# Patient Record
Sex: FEMALE | Race: BLACK OR AFRICAN AMERICAN | Employment: OTHER | URBAN - METROPOLITAN AREA
[De-identification: names, ages, dates, MRNs, and addresses within clinical notes are randomized per-mention and may not be internally consistent; named-entity substitution may affect disease eponyms.]

---

## 2020-10-28 ENCOUNTER — OFFICE VISIT (OUTPATIENT)
Dept: ORTHOPEDIC SURGERY | Age: 45
End: 2020-10-28
Payer: COMMERCIAL

## 2020-10-28 VITALS
HEART RATE: 80 BPM | OXYGEN SATURATION: 99 % | HEIGHT: 67 IN | TEMPERATURE: 98.4 F | DIASTOLIC BLOOD PRESSURE: 88 MMHG | BODY MASS INDEX: 28.88 KG/M2 | RESPIRATION RATE: 17 BRPM | WEIGHT: 184 LBS | SYSTOLIC BLOOD PRESSURE: 149 MMHG

## 2020-10-28 DIAGNOSIS — M75.111 NONTRAUMATIC INCOMPLETE BILATERAL ROTATOR CUFF TEAR: Primary | ICD-10-CM

## 2020-10-28 DIAGNOSIS — M75.112 NONTRAUMATIC INCOMPLETE BILATERAL ROTATOR CUFF TEAR: Primary | ICD-10-CM

## 2020-10-28 PROCEDURE — 99204 OFFICE O/P NEW MOD 45 MIN: CPT | Performed by: ORTHOPAEDIC SURGERY

## 2020-10-28 NOTE — PROGRESS NOTES
Patient: Wesley Chandra                MRN: [id number]       SSN: [id number]  YOB: 1975        AGE: 50 y.o. SEX: female  Body mass index is 28.82 kg/m². PCP: Karley, Not On File  10/28/20    CHIEF COMPLAINT: Bilateral shoulder pain 7/10    HPI: Wesley Chandra is a 50 y.o. female patient who presents to the office today of bilateral shoulder pain. She has the right is worse than the left. She rates the right is 7 out of 10. She has been dealing with her shoulder pain for 2 years. She is at home physical therapy as well as an injection ordered by her primary care doctor which is not resolved her pain. She is had x-rays and an MRI. She has not had in person physical therapy. She says initially she had stiffness but now she reports mostly stiffness and pain. The pain bothers her at night. Past Medical History:   Diagnosis Date    Anxiety     Anxiety     Depression     Insomnia     Migraine     PTSD (post-traumatic stress disorder)        Family History   Problem Relation Age of Onset    Cancer Sister         panreatic    Diabetes Mother     Heart Disease Mother        Current Outpatient Medications   Medication Sig Dispense Refill    gabapentin (NEURONTIN) 400 mg capsule Take 400 mg by mouth three (3) times daily.  ibuprofen (MOTRIN) 600 mg tablet Take 1 tablet by mouth every six (6) hours as needed for Pain. 20 tablet 0    traZODone (DESYREL) 100 mg tablet Take 100 mg by mouth nightly.  QUEtiapine (SEROQUEL) 200 mg tablet Take 200 mg by mouth two (2) times a day.  verapamil (CALAN) 80 mg tablet Take  by mouth three (3) times daily.  buPROPion SR (WELLBUTRIN SR) 150 mg SR tablet Take  by mouth two (2) times a day.  sertraline (ZOLOFT) 100 mg tablet Take  by mouth daily.  chlorpheniramine-HYDROcodone (TUSSIONEX PENNKINETIC ER) 8-10 mg/5 mL suspension Take 5 mL by mouth every twelve (12) hours as needed for Cough.  60 mL 0    fexofenadine (ALLEGRA) 60 mg tablet Take 1 tablet by mouth daily.  20 tablet 0       Allergies   Allergen Reactions    Pcn [Penicillins] Angioedema       Past Surgical History:   Procedure Laterality Date    HX BREAST BIOPSY      left breast       Social History     Socioeconomic History    Marital status:      Spouse name: Not on file    Number of children: Not on file    Years of education: Not on file    Highest education level: Not on file   Occupational History    Not on file   Social Needs    Financial resource strain: Not on file    Food insecurity     Worry: Not on file     Inability: Not on file    Transportation needs     Medical: Not on file     Non-medical: Not on file   Tobacco Use    Smoking status: Never Smoker    Smokeless tobacco: Never Used   Substance and Sexual Activity    Alcohol use: No    Drug use: No    Sexual activity: Not on file   Lifestyle    Physical activity     Days per week: Not on file     Minutes per session: Not on file    Stress: Not on file   Relationships    Social connections     Talks on phone: Not on file     Gets together: Not on file     Attends Druze service: Not on file     Active member of club or organization: Not on file     Attends meetings of clubs or organizations: Not on file     Relationship status: Not on file    Intimate partner violence     Fear of current or ex partner: Not on file     Emotionally abused: Not on file     Physically abused: Not on file     Forced sexual activity: Not on file   Other Topics Concern    Not on file   Social History Narrative    Not on file       REVIEW OF SYSTEMS:      CON: negative for recent weight loss/gain, fever, or chills  EYE: negative for double or blurry vision  ENT: negative for hoarseness  RS:   negative for cough, URI, SOB  CV:  negative for chest pain, palpitations  GI:    negative for blood in stool, nausea/vomiting  :  negative for blood in urine  MS: As per HPI  Other systems reviewed and noted below. PHYSICAL EXAMINATION:  Visit Vitals  BP (!) 149/88 (BP 1 Location: Right arm)   Pulse 80   Temp 98.4 °F (36.9 °C)   Resp 17   Ht 5' 7\" (1.702 m)   Wt 184 lb (83.5 kg)   SpO2 99%   BMI 28.82 kg/m²     Body mass index is 28.82 kg/m². GENERAL: Alert and oriented x3, in no acute distress, well-developed, well-nourished. HEENT: Normocephalic, atraumatic. RESP: Non labored breathing with equal chest rise on inspiration. CV: Well perfused extremities. No cyanosis or clubbing noted. ABDOMEN: Soft, non-tender, non-distended. Shoulder Examination     R   L  ROM   FF  Full   Full  ER  Full   Full   IR  Full   Full  Rotator Cuff Pain   Supra  +   +   Infra  +   -   Subscap -   -  Crepitus  -   -  Effusion  -   -  Warmth  -   -   Erythema  -   -  Instability  -   -  AC Joint TTP  -   -  Clavicle   Deformity -   -   TTP  -   -  Proximal Humerus   Deformity -   -   TTP  -   -  Deltoid Strength 5   5  Biceps Strength 5   5  Biceps Deformity -   -  Biceps Groove Pain +   -  Impingement Sign -   -   Pain with active and passive range of motion at the extremes of motion especially overhead. IMAGING:  X-rays of both shoulders previously taken were reviewed in the office today. These show no significant bony abnormalities    An MRI of the right shoulder was reviewed which shows high-grade partial-thickness tear of the supraspinatus. No muscle atrophy or full-thickness tears are noted. No tearing of the biceps is noted. An MRI of the left shoulder was also reviewed today which again shows a high-grade partial-thickness tear of the supraspinatus. No full-thickness tears are identified. No muscle atrophy. ASSESSMENT & PLAN  Diagnosis: Bilateral high-grade partial-thickness rotator cuff tears, supraspinatus    Marisela ([place] has bilateral rotator cuff partial-thickness tears high-grade. She has had video physical therapy but has not had any in person physical therapy.   We discussed the possibility of surgery. At this point time we will try a structured in person physical therapy program to see how she responds to that. If she gets better she can leave this alone will continue with therapy exercises. If her symptoms persist despite therapy we did discuss the possibility of surgery in the form of arthroscopic rotator cuff repair. I will see her back in 6 weeks to see how she responds.     Total Visit Time over 45 minutes    Electronically signed by: Edil Santamaria MD

## 2020-11-03 ENCOUNTER — HOSPITAL ENCOUNTER (OUTPATIENT)
Dept: PHYSICAL THERAPY | Age: 45
Discharge: HOME | End: 2020-11-03
Attending: ORTHOPAEDIC SURGERY
Payer: OTHER GOVERNMENT

## 2020-11-03 PROCEDURE — 97530 THERAPEUTIC ACTIVITIES: CPT

## 2020-11-03 PROCEDURE — 97162 PT EVAL MOD COMPLEX 30 MIN: CPT

## 2020-11-03 PROCEDURE — 97110 THERAPEUTIC EXERCISES: CPT

## 2020-11-03 NOTE — PROGRESS NOTES
PT DAILY TREATMENT NOTE     Patient Name: Amari Varghese  Date:11/3/2020  : 1975  [x]  Patient  Verified  Payor: [facility] CCN / Plan: [facility] CCN / Product Type: Federal Funded Programs /    [employer] time:930  Total Treatment Time (min): 34  Visit #: 1 of 12    Treatment Area: Incomplete rotator cuff tear or rupture of right shoulder, not specified as traumatic [M75.111]  Incomplete rotator cuff tear or rupture of left shoulder, not specified as traumatic [M75.112]    Physical Therapy Evaluation - Shoulder    SUBJECTIVE      Any medication changes, allergies to medications, adverse drug reactions, diagnosis change, or new procedure performed?: [x] No    [] Yes (see summary sheet for update)    Subjective functional status/changes:     PLOF: LHD, (I) Functional ADLs, (I) Self-Care ADLs, Self-Employeed  Current Functional Status: Difficulty with self-care ADLs, Difficulty with computer based work   Work Hx: [employer] - Self-Employed (primarily computer-based)  Comorbidities: Anxiety, Depression, PTSD, Migraine  Medications: None Utilized    Subjective: Patient presents with bilateral shoulder pain (right>left) with presence x2 years. Patient reports completion of home health physical therapy via telehealth as well as an injection without symptom improvement. Patient as well with receival of shoulder x-rays and MRI. U[on evaluation by MD with determination to trail outpatient physical therapy with MD questionig requirement for patient to undergo surgical intervention if patient without positive response with receival of conservative treatment. Patient reports on the right noting posterior shoulder pain with radiation along the right posterior arm to the elbow with left shoulder pain only with ROM. Patient reports increase in pain with positions of functional ER (I.e. shaving, dressing, hair maintenance) and lifting.  Patient does report sleep disturbances secondary to pain. Patient reports intermittent episodes of left hand N/T, non-dermatomal pattern, with relief with shaking her head. Patient does report use of TENS unit with no temporary symptom relief. Patient reports receival of left shoulder cortisone injection x2 1 year ago with positive benefit. Pain Intensity (0-10, VAS): Current Left 0/10, Right 2/10, Worst 8, Best 0    Patient Goals: \"Get rid of the pain. \"    IMAGING: (per MD note 10/28/2020)  X-rays of both shoulders previously taken were reviewed in the office today. These show no significant bony abnormalities     An MRI of the right shoulder was reviewed which shows high-grade partial-thickness tear of the supraspinatus. No muscle atrophy or full-thickness tears are noted. No tearing of the biceps is noted.     An MRI of the left shoulder was also reviewed today which again shows a high-grade partial-thickness tear of the supraspinatus. No full-thickness tears are identified. No muscle atrophy.     OBJECTIVE EXAMINATION    BP: 118/74 mmHg  Posture: Left scapular depression/protraction    Cervical Screen: Flexion WNL, Extension WNL, Left Rotation WNL, Right Rotation WNL (right shoulder blade pain), (-) Compression    ROM:  [] Unable to assess at this time                                            AROM                                                              PROM   Left Right  Left Right   Flexion 142 (+ painful arc) 66 (p!) Flexion 155 (empty) 132 (empty)   Extension   Extension     Abduction 112 (+ painful arc) 82 (p!) Abduction 180 (empty) 90 (empty)   ER @ 0 Degrees   ER @ 0 Degrees     Functional ER C1 Right Ear ER @ 90 Degrees 95 55 (empty)   Functional IR T10 Unable IR @ 90 Degrees 55 40 (empty)     Strength:   [] Unable to assess at this time                                                                            L (1-5) R (1-5)   Flexors 5 2+   Abductors 5 (p!) 2+   External Rotators 5 3+ (p!)   Internal Rotators 5 5 (p!)   Extensors 5 5 (p!)   Elbow Flexion 5 5   Elbow Extension 5 5     Optional Tests:    Pectoral Tightness [x] Pos   [] Neg  Anterior Apprehension [] Pos   [x] Neg     OBJECTIVE    18 min [x]Eval                  []Re-Eval     8 min Therapeutic Exercise:  [x] See flow sheet : Patient educated regarding completion of prescribed HEP and provided with written HEP instructions, Patient educated regarding diagnosis and PT POC   Rationale: increase ROM and increase strength to improve the patients ability to improve ease with functional ADLs    8 min Therapeutic Activity:  [x]  See flow sheet : Review of postural modifications with workplace place ergonomics, Review of seat positionign, Review of arm swing with ambulation   Rationale: increase ROM and increase strength  to improve the patients ability to improve ease with work-related ADLs           With   [] TE   [] TA   [] neuro   [] other: Patient Education: [x] Review HEP    [] Progressed/Changed HEP based on:   [] positioning   [] body mechanics   [] transfers   [] heat/ice application    [] other:      Other Objective/Functional Measures: See objective above. Pain Level (0-10 scale) post treatment: Left Shoulder 0/10, Right Shoulder 2/10    ASSESSMENT/Changes in Function: Patient presents with s/s consistent with bilateral rotator cuff tendinopathy (right> left) with MRI confirmed high-grade partial thickness tear of the supraspinatus bilaterally. Patient demonstrates objectively greater loss of right shoulder AROM and strength in comparison to the left with apprehension and guarding with movement. To initially emphasis reduction in symptom severity and irritability, especially with regards to the right shoulder, with progression with reduction in symptom irritability to improve available functional bilateral shoulder ROM and strength. If with continued high symptom severity without relief with receival of PT services to refer back to provider.     Patient will continue to benefit from skilled PT services to modify and progress therapeutic interventions, address functional mobility deficits, address ROM deficits, address strength deficits, analyze and address soft tissue restrictions, analyze and cue movement patterns, analyze and modify body mechanics/ergonomics and assess and modify postural abnormalities to attain remaining goals. [x]  See Plan of Care  []  See progress note/recertification  []  See Discharge Summary         Progress towards goals / Updated goals:    Short Term Goals: To be accomplished in 3 weeks:  1. Patient will subjectively report full compliance with prescribed HEP. Eval: HEP provided  2. Patient will demonstrate right shoulder flexion and abduction PROM >/= 150 degrees to improve ease with dressing. Eval: Right Shoulder Flexion PROM = 132 deg, Right Shoulder Abduction PROM = 90 deg  3. Patient will demonstrate left//right shoulder flexion AROM >/= 145 deg to improve ease with overhead reach. Eval: Left Shoulder Flexion AROM = 142 deg, Right Shoulder Flexion AROM = 66 deg    Long Term Goals: To be accomplished in 6 weeks:  1. Patient will demonstrate a significant functional improvement as demonstrated by a score of >/= 64 on FOTO. Eval: FOTO = 53       2. Patient will demonstrate right shoulder flexion and abduction MMT >/= 4+/5 to improve ease with functional lifting. Eval: Right Shoulder Flexion MMT = 2+/5 (pain), Right Shoulder Abduction MMT = 2+/5 (pain)  3. Patient will demonstrate right shoulder functional ER >/= C2 to improve ease with self-care ADLs.   Eval: Right Shoulder Functional ER = C2      PLAN  [x]  Upgrade activities as tolerated     []  Continue plan of care  []  Update interventions per flow sheet       []  Discharge due to:_  []  Other:_      Shirley Joy, PT 11/3/2020  7:02 AM    Future Appointments   Date Time Provider Woodrow Drake   11/3/2020  8:45 AM Bryn Andres, PT [facility]

## 2020-11-03 NOTE — PROGRESS NOTES
[facility] - [facility]              [address]        [place], [address] [place]   [phone] fax    Plan of Care/ Statement of Necessity for Physical Therapy Services  Patient name: Richie Marcus Start of Care: 11/3/2020   Referral source: Sivakumar Sofia MD : 1975    Medical Diagnosis: Incomplete rotator cuff tear or rupture of right shoulder, not specified as traumatic [M75.111]  Incomplete rotator cuff tear or rupture of left shoulder, not specified as traumatic [M75.112]  Payor: Lucien Paulson / Plan: Cristy Fail / Product Type: Federal Funded Programs /  Onset Date:  Left Shoulder 2017  Right Shoulder 2018    Treatment Diagnosis: Bilateral Shoulder Weakness   Prior Hospitalization: see medical history Provider#: [id number]   Medications: Verified on Patient summary List    Comorbidities: Anxiety, Depression, PTSD, Migraine   Prior Level of Function: LHD, (I) Functional ADLs, (I) Self-Care ADLs, Self-Employeed     The Plan of Care and following information is based on the information from the initial evaluation. Assessment:    Patient presents with s/s consistent with bilateral rotator cuff tendinopathy (right> left) with MRI confirmed high-grade partial thickness tear of the supraspinatus bilaterally. Patient demonstrates objectively greater loss of right shoulder AROM and strength in comparison to the left with apprehension and guarding with movement. To initially emphasis reduction in symptom severity and irritability, especially with regards to the right shoulder, with progression with reduction in symptom irritability to improve available functional bilateral shoulder ROM and strength.  If with continued high symptom severity without relief with receival of PT services to refer back to provider.     Patient will continue to benefit from skilled PT services to modify and progress therapeutic interventions, address functional mobility deficits, address ROM deficits, address strength deficits, analyze and address soft tissue restrictions, analyze and cue movement patterns, analyze and modify body mechanics/ergonomics and assess and modify postural abnormalities to attain remaining goals. Key Information:    Shoulder ROM:  []? Unable to assess at this time                                            AROM                                                              PROM    Left Right   Left Right   Flexion 142 (+ painful arc) 66 (p!) Flexion 155 (empty) 132 (empty)   Extension     Extension       Abduction 112 (+ painful arc) 82 (p!) Abduction 180 (empty) 90 (empty)   ER @ 0 Degrees     ER @ 0 Degrees       Functional ER C1 Right Ear ER @ 90 Degrees 95 55 (empty)   Functional IR T10 Unable IR @ 90 Degrees 55 40 (empty)      Shoulder Strength:   []? Unable to assess at this time                                                                             L (1-5) R (1-5)   Flexors 5 2+   Abductors 5 (p!) 2+   External Rotators 5 3+ (p!)   Internal Rotators 5 5 (p!)   Extensors 5 5 (p!)   Elbow Flexion 5 5   Elbow Extension 5 5     Evaluation Complexity History MEDIUM  Complexity : 1-2 comorbidities / personal factors will impact the outcome/ POC ; Examination MEDIUM Complexity : 3 Standardized tests and measures addressing body structure, function, activity limitation and / or participation in recreation  ;Presentation MEDIUM Complexity : Evolving with changing characteristics  ; Clinical Decision Making MEDIUM Complexity : FOTO score of 26-74  Overall Complexity Rating: MEDIUM  Problem List: pain affecting function, decrease ROM, decrease strength, decrease ADL/ functional abilitiies, decrease activity tolerance, decrease flexibility/ joint mobility and decrease transfer abilities   Treatment Plan may include any combination of the following: Therapeutic exercise, Therapeutic activities, Neuromuscular re-education, Physical agent/modality, Manual therapy, Patient education, Self Care training, Functional mobility training and Home safety training  Patient / Family readiness to learn indicated by: asking questions, trying to perform skills and interest  Persons(s) to be included in education: patient (P)  Barriers to Learning/Limitations: None  Patient Goal (s): Get rid of the pain  Patient Self Reported Health Status: good  Rehabilitation Potential: good    Short Term Goals: To be accomplished in 3 weeks:  1. Patient will subjectively report full compliance with prescribed HEP. Eval: HEP provided  2. Patient will demonstrate right shoulder flexion and abduction PROM >/= 150 degrees to improve ease with dressing. Eval: Right Shoulder Flexion PROM = 132 deg, Right Shoulder Abduction PROM = 90 deg  3. Patient will demonstrate left//right shoulder flexion AROM >/= 145 deg to improve ease with overhead reach. Eval: Left Shoulder Flexion AROM = 142 deg, Right Shoulder Flexion AROM = 66 deg    Long Term Goals: To be accomplished in 6 weeks:  1. Patient will demonstrate a significant functional improvement as demonstrated by a score of >/= 64 on FOTO. Eval: FOTO = 53       2. Patient will demonstrate right shoulder flexion and abduction MMT >/= 4+/5 to improve ease with functional lifting. Eval: Right Shoulder Flexion MMT = 2+/5 (pain), Right Shoulder Abduction MMT = 2+/5 (pain)  3. Patient will demonstrate right shoulder functional ER >/= C2 to improve ease with self-care ADLs. Eval: Right Shoulder Functional ER = C2    Frequency / Duration: Patient to be seen 2 times per week for 6 weeks.     Patient/ Caregiver education and instruction: Diagnosis, prognosis, self care, activity modification and exercises   [x]  Plan of care has been reviewed with PTA Renelda Bosworth, PT 11/3/2020 7:04 AM  ________________________________________________________________________    I certify that the above Therapy Services are being furnished while the patient is under my care. I agree with the treatment plan and certify that this therapy is necessary.     Physician's Signature:____________Date:_________TIME:________    ** Signature, Date and Time must be completed for valid certification **  Please sign and return to [facility] - [address], [address] [place]   [phone] fax

## 2020-11-10 ENCOUNTER — HOSPITAL ENCOUNTER (OUTPATIENT)
Dept: PHYSICAL THERAPY | Age: 45
Discharge: HOME | End: 2020-11-10
Attending: ORTHOPAEDIC SURGERY
Payer: OTHER GOVERNMENT

## 2020-11-10 PROCEDURE — 97112 NEUROMUSCULAR REEDUCATION: CPT

## 2020-11-10 PROCEDURE — 97140 MANUAL THERAPY 1/> REGIONS: CPT

## 2020-11-10 PROCEDURE — 97110 THERAPEUTIC EXERCISES: CPT

## 2020-11-10 NOTE — PROGRESS NOTES
PT DAILY TREATMENT NOTE     Patient Name: Wesley Chandra  Date:11/10/2020  : 1975  [x]  Patient  Verified  [facility]or: [facility] / [facility] / Product Type: Federal Funded Programs /    In time:8:04  Out time:8:59  Total Treatment Time (min): 55  Visit #: 2 of 12      Treatment Area: Incomplete rotator cuff tear or rupture of right shoulder, not specified as traumatic [M75.111]  Incomplete rotator cuff tear or rupture of left shoulder, not specified as traumatic [M75.112]    SUBJECTIVE  Pain Level (0-10 scale): left 2; right 6  Any medication changes, allergies to medications, adverse drug reactions, diagnosis change, or new procedure performed?: [x] No    [] Yes (see summary sheet for update)  Subjective functional status/changes:   [] No changes reported  Pt reports that getting dress continues to be challenging due to her shoulders. OBJECTIVE    Modality rationale: decrease pain to improve the patients ability to decrease difficulty with performing tasks. Min Type Additional Details   10 []  Ice     [x]  heat  []  Ice massage  []  Laser   []  Anodyne Position:sitting  Location:B shoulder   [] Skin assessment post-treatment:  []intact []redness- no adverse reaction    []redness - adverse reaction:     25 min Therapeutic Exercise:  [x] See flow sheet :   Rationale: increase ROM and increase strength to improve the patients ability to increase tolerance to activities. 10 min Neuromuscular Re-education:  [x]  See flow sheet :emphasis on scapular stability    Rationale: increase ROM, increase strength and increase proprioception  to improve the patients ability to increase ease with overhead reaching. 10 min Manual Therapy:    Supine- B GHJ AP and inferior mobs grade 1-2  Supine - B PROM in all planes   The manual therapy interventions were performed at a separate and distinct time from the therapeutic activities interventions.   Rationale: decrease pain, increase ROM, increase tissue extensibility and decrease trigger points to increase ease with ADLs. With   [] TE   [] TA   [] neuro   [] other: Patient Education: [x] Review HEP    [] Progressed/Changed HEP based on:   [] positioning   [] body mechanics   [] transfers   [] heat/ice application    [] other:      Other Objective/Functional Measures: Pt has initiated HEP without full compliance. Pain Level (0-10 scale) post treatment: 0    ASSESSMENT/Changes in Function: Initiated exercises per POC. With performing supine wand in flexion and scaption, pt was having catching feeling in B shoulder a different arches of the motion. Patient will continue to benefit from skilled PT services to modify and progress therapeutic interventions, address functional mobility deficits, address ROM deficits, address strength deficits and analyze and address soft tissue restrictions to attain remaining goals. []  See Plan of Care  []  See progress note/recertification  []  See Discharge Summary         Progress towards goals / Updated goals:  Short Term Goals: To be accomplished in 3 weeks:  1. Patient will subjectively report full compliance with prescribed HEP. Eval: HEP provided  Current: Progressing: Pt has initiated HEP without full compliance. 11/10/20  2. Patient will demonstrate right shoulder flexion and abduction PROM >/= 150 degrees to improve ease with dressing. Eval: Right Shoulder Flexion PROM = 132 deg, Right Shoulder Abduction PROM = 90 deg  3. Patient will demonstrate left//right shoulder flexion AROM >/= 145 deg to improve ease with overhead reach. Eval: Left Shoulder Flexion AROM = 142 deg, Right Shoulder Flexion AROM = 66 deg     Long Term Goals: To be accomplished in 6 weeks:  1. Patient will demonstrate a significant functional improvement as demonstrated by a score of >/= 64 on FOTO. Eval: FOTO = 53       2.  Patient will demonstrate right shoulder flexion and abduction MMT >/= 4+/5 to improve ease with functional lifting. Eval: Right Shoulder Flexion MMT = 2+/5 (pain), Right Shoulder Abduction MMT = 2+/5 (pain)  3. Patient will demonstrate right shoulder functional ER >/= C2 to improve ease with self-care ADLs.   Eval: Right Shoulder Functional ER = C2       PLAN  []  Upgrade activities as tolerated     [x]  Continue plan of care  []  Update interventions per flow sheet       []  Discharge due to:_  []  Other:_      Ovidio Birmingham PTA 11/10/2020  8:05 AM    Future Appointments   Date Time Provider Woodrow Drake   11/12/2020  5:00 PM Diana Morales, PT [facility]   11/16/2020  9:30 AM Diana Morales, PT [facility]   11/18/2020  8:00 AM Diana Morales, PT [facility]   11/30/2020 11:00 AM Danae Rivera, PT [facility]

## 2020-11-12 ENCOUNTER — HOSPITAL ENCOUNTER (OUTPATIENT)
Dept: PHYSICAL THERAPY | Age: 45
Discharge: HOME | End: 2020-11-12
Attending: ORTHOPAEDIC SURGERY
Payer: OTHER GOVERNMENT

## 2020-11-16 ENCOUNTER — HOSPITAL ENCOUNTER (OUTPATIENT)
Dept: PHYSICAL THERAPY | Age: 45
Discharge: HOME | End: 2020-11-16
Attending: ORTHOPAEDIC SURGERY
Payer: OTHER GOVERNMENT

## 2020-11-16 PROCEDURE — 97110 THERAPEUTIC EXERCISES: CPT | Performed by: PHYSICAL THERAPIST

## 2020-11-16 PROCEDURE — 97140 MANUAL THERAPY 1/> REGIONS: CPT | Performed by: PHYSICAL THERAPIST

## 2020-11-16 PROCEDURE — 97112 NEUROMUSCULAR REEDUCATION: CPT | Performed by: PHYSICAL THERAPIST

## 2020-11-16 NOTE — PROGRESS NOTES
PT DAILY TREATMENT NOTE     Patient Name: Maryse Mann  Date:2020  : 1975  [x]  Patient  Verified  Pay[facility]: [facility] CCN / [facility] / Product Type: Federal Funded Programs /    In time:9:30  Out time:10:23  Total Treatment Time (min): 53  Visit #: 3 of 12    Treatment Area: Incomplete rotator cuff tear or rupture of right shoulder, not specified as traumatic [M75.111]  Incomplete rotator cuff tear or rupture of left shoulder, not specified as traumatic [M75.112]    SUBJECTIVE  Pain Level (0-10 scale): right shoulder 5/10; left 0/10  Any medication changes, allergies to medications, adverse drug reactions, diagnosis change, or new procedure performed?: [x] No    [] Yes (see summary sheet for update)  Subjective functional status/changes:   [] No changes reported  Patient continues to note right shoulder pain. Triggers include movement and lying on her right side. Pain with turning knobs on her dryer. Symptoms eased with rest.      OBJECTIVE    Modality rationale: decrease inflammation and decrease pain to improve the patients ability to increase ease of motion to improve function.    Min Type Additional Details    [] Estim:  []Unatt       []IFC  []Premod                        []Other:  []w/ice   []w/heat  Position:  Location:    [] Estim: []Att    []TENS instruct  []NMES                    []Other:  []w/US   []w/ice   []w/heat  Position:  Location:    []  Traction: [] Cervical       []Lumbar                       [] Prone          []Supine                       []Intermittent   []Continuous Lbs:  [] before manual  [] after manual    []  Ultrasound: []Continuous   [] Pulsed                           []1MHz   []3MHz W/cm2:  Location:    []  Iontophoresis with dexamethasone         Location: [] Take home patch   [] In clinic   10 [x]  Ice     []  heat  []  Ice massage  []  Laser   []  Anodyne Position: sitting  Location:B shoulders    []  Laser with stim  [] Other:  Position:  Location:    []  Vasopneumatic Device Pressure:       [] lo [] med [] hi   Temperature: [] lo [] med [] hi   [] Skin assessment post-treatment:  []intact []redness- no adverse reaction    []redness - adverse reaction:       23 min Therapeutic Exercise:  [] See flow sheet :Emphasis on increasing AROM and strength of bilateral UE   Rationale: increase ROM and increase strength to improve the patients ability to increase tolerance to activity. 10 min Neuromuscular Re-education:  []  See flow sheet :Emphasis on activation and recruitment of scapular muscles to improve shoulder stability. Rationale: increase strength, improve coordination and increase proprioception  to improve the patients ability to increase ease with overhead lifting. 10 min Manual Therapy:  B GH mobs grade I, II distraction and inferior glides   The manual therapy interventions were performed at a separate and distinct time from the therapeutic activities interventions. Rationale: increase ROM and increase tissue extensibility to increase ease of motion to improve function. With   [] TE   [] TA   [] neuro   [] other: Patient Education: [x] Review HEP    [] Progressed/Changed HEP based on:   [] positioning   [] body mechanics   [] transfers   [] heat/ice application    [] other:      Other Objective/Functional Measures: Pain with empty end feel to motion in both shoulders. Active guarding. Pain Level (0-10 scale) post treatment: 7    ASSESSMENT/Changes in Function: Patient continues with pain and limited AROM of both shoulders, right > left. Patient will continue to benefit from skilled PT services to modify and progress therapeutic interventions, address functional mobility deficits, address ROM deficits, address strength deficits and analyze and address soft tissue restrictions to attain remaining goals.      [x]  See Plan of Care  []  See progress note/recertification  []  See Discharge Summary Progress towards goals / Updated goals:  Short Term Goals: To be accomplished in 3 weeks:  1. Patient will subjectively report full compliance with prescribed HEP. Eval: HEP provided  Current: Progressing: Pt has initiated HEP without full compliance. 11/10/20  2. Patient will demonstrate right shoulder flexion and abduction PROM >/= 150 degrees to improve ease with dressing. Eval: Right Shoulder Flexion PROM = 132 deg, Right Shoulder Abduction PROM = 90 deg  3. Patient will demonstrate left//right shoulder flexion AROM >/= 145 deg to improve ease with overhead reach. Eval: Left Shoulder Flexion AROM = 142 deg, Right Shoulder Flexion AROM = 66 deg     Long Term Goals: To be accomplished in 6 weeks:  1. Patient will demonstrate a significant functional improvement as demonstrated by a score of >/= 64 on FOTO. Eval: FOTO = 53       2. Patient will demonstrate right shoulder flexion and abduction MMT >/= 4+/5 to improve ease with functional lifting. Eval: Right Shoulder Flexion MMT = 2+/5 (pain), Right Shoulder Abduction MMT = 2+/5 (pain)  3. Patient will demonstrate right shoulder functional ER >/= C2 to improve ease with self-care ADLs.   Eval: Right Shoulder Functional ER = C2    PLAN  [x]  Upgrade activities as tolerated     [x]  Continue plan of care  []  Update interventions per flow sheet       []  Discharge due to:_  []  Other:_      Elba Warner PT 11/16/2020  9:34 AM    Future Appointments   Date Time Provider Woodrow Drake   11/18/2020  8:00 AM Christine Aguilar, PT [facility]   11/30/2020 11:00 AM Matthew Eden PT [facility]

## 2020-11-18 ENCOUNTER — HOSPITAL ENCOUNTER (OUTPATIENT)
Dept: PHYSICAL THERAPY | Age: 45
Discharge: HOME | End: 2020-11-18
Attending: ORTHOPAEDIC SURGERY
Payer: OTHER GOVERNMENT

## 2020-11-18 PROCEDURE — 97140 MANUAL THERAPY 1/> REGIONS: CPT | Performed by: PHYSICAL THERAPIST

## 2020-11-18 PROCEDURE — 97112 NEUROMUSCULAR REEDUCATION: CPT | Performed by: PHYSICAL THERAPIST

## 2020-11-18 PROCEDURE — 97110 THERAPEUTIC EXERCISES: CPT | Performed by: PHYSICAL THERAPIST

## 2020-11-18 NOTE — PROGRESS NOTES
PT DAILY TREATMENT NOTE     Patient Name: Chase Lomas  Date:2020  : 1975  [x]  Patient  Verified  Payor: [facility] CCN / [facility] / Product Type: Federal Funded Programs /    In time:8:05  Out time:8:52  Total Treatment Time (min): 47  Visit #: 4 of 12    Treatment Area: Incomplete rotator cuff tear or rupture of right shoulder, not specified as traumatic [M75.111]  Incomplete rotator cuff tear or rupture of left shoulder, not specified as traumatic [M75.112]    SUBJECTIVE  Pain Level (0-10 scale): Left 3; Right 6  Any medication changes, allergies to medications, adverse drug reactions, diagnosis change, or new procedure performed?: [x] No    [] Yes (see summary sheet for update)  Subjective functional status/changes:   [] No changes reported  Patient reports increased shoulder pain this morning in both right and left shoulders. OBJECTIVE      27 min Therapeutic Exercise:  [] See flow sheet :Emphasis on increasing AROM and strength of bilateral UE   Rationale: increase ROM and increase strength to improve the patients ability to increase tolerance to activity. 10 min Neuromuscular Re-education:  []  See flow sheet :Emphasis on activation and recruitment of scapular muscles to improve shoulder stability. Rationale: increase strength, improve coordination and increase proprioception  to improve the patients ability to increase ease with overhead lifting. 10 min Manual Therapy:   B GH mobs grade I, II distraction and inferior glides. Manual stretching to increase elevation and rotation. The manual therapy interventions were performed at a separate and distinct time from the therapeutic activities interventions. Rationale: decrease pain, increase ROM and increase tissue extensibility to increase ease of motion to improve function.           With   [] TE   [] TA   [] neuro   [] other: Patient Education: [x] Review HEP    [] Progressed/Changed HEP based on:   [] positioning   [] body mechanics   [] transfers   [] heat/ice application    [] other:      Other Objective/Functional Measures: Patient with painful, empty end feel both right and left shoulders. Pain Level (0-10 scale) post treatment: 0    ASSESSMENT/Changes in Function: Patient continues with pain and limited functional use of both shoulders. Patient will continue to benefit from skilled PT services to modify and progress therapeutic interventions, address functional mobility deficits, address ROM deficits, address strength deficits and analyze and address soft tissue restrictions to attain remaining goals. [x]  See Plan of Care  []  See progress note/recertification  []  See Discharge Summary         Progress towards goals / Updated goals:  Short Term Goals: To be accomplished in 3 weeks:  1. Patient will subjectively report full compliance with prescribed HEP. Eval: HEP provided  Current: Progressing: Pt has initiated HEP without full compliance. 11/10/20  2. Patient will demonstrate right shoulder flexion and abduction PROM >/= 150 degrees to improve ease with dressing. Eval: Right Shoulder Flexion PROM = 132 deg, Right Shoulder Abduction PROM = 90 deg  Current:  Right shoulder PROM flex 0-140; abd 0-90.  11/18/20220. Progressing. 3. Patient will demonstrate left//right shoulder flexion AROM >/= 145 deg to improve ease with overhead reach. Eval: Left Shoulder Flexion AROM = 142 deg, Right Shoulder Flexion AROM = 66 deg     Long Term Goals: To be accomplished in 6 weeks:  1. Patient will demonstrate a significant functional improvement as demonstrated by a score of >/= 64 on FOTO. Eval: FOTO = 53       2. Patient will demonstrate right shoulder flexion and abduction MMT >/= 4+/5 to improve ease with functional lifting. Eval: Right Shoulder Flexion MMT = 2+/5 (pain), Right Shoulder Abduction MMT = 2+/5 (pain)  3.  Patient will demonstrate right shoulder functional ER >/= C2 to improve ease with self-care ADLs.   Eval: Right Shoulder Functional ER = C2    PLAN  [x]  Upgrade activities as tolerated     [x]  Continue plan of care  []  Update interventions per flow sheet       []  Discharge due to:_  []  Other:_      Tabitha Galeana, PT 11/18/2020  8:21 AM    Future Appointments   Date Time Provider Woodrow Drake   11/30/2020 11:00 AM Chiqui Vasques, PT [facility] [id number]

## 2020-11-30 ENCOUNTER — APPOINTMENT (OUTPATIENT)
Dept: PHYSICAL THERAPY | Age: 45
End: 2020-11-30
Attending: ORTHOPAEDIC SURGERY
Payer: OTHER GOVERNMENT

## 2020-12-01 ENCOUNTER — HOSPITAL ENCOUNTER (OUTPATIENT)
Dept: PHYSICAL THERAPY | Age: 45
Discharge: HOME | End: 2020-12-01
Attending: ORTHOPAEDIC SURGERY

## 2020-12-01 PROCEDURE — 97112 NEUROMUSCULAR REEDUCATION: CPT

## 2020-12-01 PROCEDURE — 97110 THERAPEUTIC EXERCISES: CPT

## 2020-12-01 PROCEDURE — 97140 MANUAL THERAPY 1/> REGIONS: CPT

## 2020-12-01 NOTE — PROGRESS NOTES
[facility]              [address], [address]   [phone] fax    Progress Note  Patient name: Sabine Correa Start of Care: 11/3/2020   Referral source: Evelia Plata MD : 1975   Medical/Treatment Diagnosis: Incomplete rotator cuff tear or rupture of right shoulder, not specified as traumatic [M75.111]  Incomplete rotator cuff tear or rupture of left shoulder, not specified as traumatic [M75.112]  Payor: Julia Ricks / Plan: Laisha Walters / Product Type: Federal Funded Programs /  Onset Date:  Left shoulder 2017  Right shoulder 2018     Prior Hospitalization: see medical history Provider#: [id number]   Medications: Verified on Patient Summary List    Comorbidities: Anxiety, Depression, PTSD, Migraine   Prior Level of Function: LHD, (I) Functional ADLs, (I) Self-Care ADLs, Self-Employeed  Visits from Start of Care: 5    Missed Visits: 1    Established Goals:          Short Term Goals: To be accomplished in 3 weeks:  1. Patient will subjectively report full compliance with prescribed HEP. Eval: HEP provided  Current: Progressing: Pt has initiated HEP without full compliance. 2. Patient will demonstrate right shoulder flexion and abduction PROM >/= 150 degrees to improve ease with dressing. Eval: Right Shoulder Flexion PROM = 132 deg, Right Shoulder Abduction PROM = 90 deg  Current:Progressing.  Right shoulder PROM flex 0-140; abd 0-90. .    3. Patient will demonstrate left//right shoulder flexion AROM >/= 145 deg to improve ease with overhead reach. Eval: Left Shoulder Flexion AROM = 142 deg, Right Shoulder Flexion AROM = 66 deg  Current: Progressing: left shoulder AROM flexion =156 deg, right shoulder flexion 95 deg.      Long Term Goals: To be accomplished in 6 weeks:  1. Patient will demonstrate a significant functional improvement as demonstrated by a score of >/= 64 on FOTO.   Eval: FOTO = 53   Current; regressed FOTO = 49.   2. Patient will demonstrate right shoulder flexion and abduction MMT >/= 4+/5 to improve ease with functional lifting. Eval: Right Shoulder Flexion MMT = 2+/5 (pain), Right Shoulder Abduction MMT = 2+/5 (pain)  Current: Remains:  Right Shoulder Flexion MMT = 2+/5 (pain), Right Shoulder Abduction MMT = 2+/5 (pain)   3. Patient will demonstrate right shoulder functional ER >/= C2 to improve ease with self-care ADLs. Eval: Right Shoulder Functional ER = Ear  Current: Goal met: T1.      Key Functional Changes: see goals above    Updated Goals: continue with unmet goals. ASSESSMENT/RECOMMENDATIONS:  Pt has progressed well toward LTGs. Pt has progressed with B PROM and AROM. Pt continues to have catching feeling with AROM in B shoulders for flexion and abduction. Pt reports that she has noticed performing daily activities have become easier.      Patient will continue to benefit from skilled PT services to modify and progress therapeutic interventions, address functional mobility deficits, address ROM deficits, address strength deficits and analyze and address soft tissue restrictions to attain remaining goals.        [x]Continue therapy per initial plan/protocol at a frequency of  2 x per week for 5 weeks  []Continue therapy with the following recommended changes:_____________________      _____________________________________________________________________  []Discontinue therapy progressing towards or have reached established goals  []Discontinue therapy due to lack of appreciable progress towards goals  []Discontinue therapy due to lack of attendance or compliance  []Await Physician's recommendations/decisions regarding therapy  []Other:________________________________________________________________    Thank you for this referral.    Jayson Griffin PTA 12/1/2020 8:56 AM  NOTE TO PHYSICIAN:  PLEASE COMPLETE THE ORDERS BELOW AND   FAX TO [facility]: [phone]  If you are unable to process this request in 24 hours please contact our office: [phone]    []  I have read the above report and request that my patient continue as recommended. []  I have read the above report and request that my patient continue therapy with the following changes/special instructions:________________________________________  []I have read the above report and request that my patient be discharged from therapy.     [de-identified] Signature:____________Date:_________TIME:________    [employer], Date and Time must be completed for valid certification **

## 2020-12-01 NOTE — PROGRESS NOTES
PT DAILY TREATMENT NOTE     Patient Name: Moshe Goltz  Date:2020  : 1975  [x]  Patient  Verified  Payor: [facility] CCN / Plan: [facility] CCN / Product Type: Federal Funded Programs /    In time:8:48  Out time:9:35  Total Treatment Time (min): 47  Visit #: 5 of 12    Treatment Area: Incomplete rotator cuff tear or rupture of right shoulder, not specified as traumatic [M75.111]  Incomplete rotator cuff tear or rupture of left shoulder, not specified as traumatic [M75.112]    SUBJECTIVE  Pain Level (0-10 scale): left 2; right 5  Any medication changes, allergies to medications, adverse drug reactions, diagnosis change, or new procedure performed?: [x] No    [] Yes (see summary sheet for update)  Subjective functional status/changes:   [] No changes reported  Pt reports she has noticed increase ease with performing tasks. OBJECTIVE       25 min Therapeutic Exercise:  [x]? See flow sheet :   Rationale: increase ROM and increase strength to improve the patients ability to increase tolerance to activities.         10 min Neuromuscular Re-education:  [x]? See flow sheet :emphasis on scapular stability    Rationale: increase ROM, increase strength and increase proprioception  to improve the patients ability to increase ease with overhead reaching.      12 min Manual Therapy:    Supine- B GHJ AP and inferior mobs grade 1-2  Supine - B PROM in all planes   The manual therapy interventions were performed at a separate and distinct time from the therapeutic activities interventions. Rationale: decrease pain, increase ROM, increase tissue extensibility and decrease trigger points to increase ease with ADLs.                 With   [] TE   [] TA   [] neuro   [] other: Patient Education: [x] Review HEP    [] Progressed/Changed HEP based on:   [] positioning   [] body mechanics   [] transfers   [] heat/ice application    [] other:      Other Objective/Functional Measures: see goals     Pain Level (0-10 scale) post treatment: right 8; left 1     ASSESSMENT/Changes in Function: Pt has progressed well toward LTGs. Pt has progressed with B PROM and AROM. Pt continues to have catching feeling with AROM in B shoulders for flexion and abduction. Pt reports that she has noticed performing daily activities have become easier. Patient will continue to benefit from skilled PT services to modify and progress therapeutic interventions, address functional mobility deficits, address ROM deficits, address strength deficits and analyze and address soft tissue restrictions to attain remaining goals. []  See Plan of Care  [x]  See progress note/recertification  []  See Discharge Summary         Progress towards goals / Updated goals:  Short Term Goals: To be accomplished in 3 weeks:  1. Patient will subjectively report full compliance with prescribed HEP. Eval: HEP provided  Current: Progressing: Pt has initiated HEP without full compliance. 11/10/20  2. Patient will demonstrate right shoulder flexion and abduction PROM >/= 150 degrees to improve ease with dressing. Eval: Right Shoulder Flexion PROM = 132 deg, Right Shoulder Abduction PROM = 90 deg  Current:Progressing. Right shoulder PROM flex 0-140; abd 0-90.  12/1/20220.    3. Patient will demonstrate left//right shoulder flexion AROM >/= 145 deg to improve ease with overhead reach. Eval: Left Shoulder Flexion AROM = 142 deg, Right Shoulder Flexion AROM = 66 deg  Current: Progressing: left shoulder AROM flexion =156 deg, right shoulder flexion 95 deg. 12/1/2020     Long Term Goals: To be accomplished in 6 weeks:  1. Patient will demonstrate a significant functional improvement as demonstrated by a score of >/= 64 on FOTO. Eval: FOTO = 53   Current; regressed FOTO = 49. 12/1/20     2. Patient will demonstrate right shoulder flexion and abduction MMT >/= 4+/5 to improve ease with functional lifting.   Eval: Right Shoulder Flexion MMT = 2+/5 (pain), Right Shoulder Abduction MMT = 2+/5 (pain)  Current: Remains:  Right Shoulder Flexion MMT = 2+/5 (pain), Right Shoulder Abduction MMT = 2+/5 (pain) 12/1/20  3. Patient will demonstrate right shoulder functional ER >/= C2 to improve ease with self-care ADLs.   Eval: Right Shoulder Functional ER = Ear  Current: Goal met: T1. 12/1/2020       PLAN  []  Upgrade activities as tolerated     [x]  Continue plan of care  []  Update interventions per flow sheet       []  Discharge due to:_  []  Other:_      Shaneka Molina PTA 12/1/2020  8:50 AM    Future Appointments   Date Time Provider Woodrow Drake   12/4/2020  9:30 AM Jonathan Shaver PTA [facility]   12/7/2020 11:45 AM Jonathan Shaver PTA [facility]   12/11/2020 10:15 AM Jonathan Shaver, PTA [facility]   12/14/2020 11:45 AM Estil Nice, PT [facility]   12/18/2020 10:15 AM Jonathan Shaver, PTA [facility]   12/21/2020 10:15 AM Jonathan Shaver, PTA [facility]   12/28/2020 11:45 AM Estil Nice, PT [facility]   12/31/2020 11:45 AM Estil Nice, PT [facility]

## 2020-12-04 ENCOUNTER — HOSPITAL ENCOUNTER (OUTPATIENT)
Dept: PHYSICAL THERAPY | Age: 45
Discharge: HOME | End: 2020-12-04
Attending: ORTHOPAEDIC SURGERY

## 2020-12-04 PROCEDURE — 97112 NEUROMUSCULAR REEDUCATION: CPT

## 2020-12-04 PROCEDURE — 97110 THERAPEUTIC EXERCISES: CPT

## 2020-12-04 PROCEDURE — 97140 MANUAL THERAPY 1/> REGIONS: CPT

## 2020-12-04 NOTE — PROGRESS NOTES
PT DAILY TREATMENT NOTE     Patient Name: Keli Mcgill  Date:2020  : 1975  [x]  Patient  Verified  Payor: [facility] / Plan: [facility] CCN / Product Type: Federal Funded Programs /    In time:9:35  Out time:10:30  Total Treatment Time (min): 55  Visit #: 1 of 10    Treatment Area: Incomplete rotator cuff tear or rupture of right shoulder, not specified as traumatic [M75.111]  Incomplete rotator cuff tear or rupture of left shoulder, not specified as traumatic [M75.112]    SUBJECTIVE  Pain Level (0-10 scale): left 1; right 5  Any medication changes, allergies to medications, adverse drug reactions, diagnosis change, or new procedure performed?: [x] No    [] Yes (see summary sheet for update)  Subjective functional status/changes:   [] No changes reported  Pt reports she was a little sore after last session after having done the new exercises. OBJECTIVE              Modality rationale: decrease pain to improve the patients ability to decrease difficulty with performing tasks. Min Type Additional Details    10 []? Ice     [x]? heat  []? Ice massage  []? Laser   []? Anodyne Position:sitting  Location:B shoulder    []? Skin assessment post-treatment:  []?intact []? redness- no adverse reaction    []? redness - adverse reaction:        25 min Therapeutic Exercise:  [x]? ? See flow sheet :   Rationale: increase ROM and increase strength to improve the patients ability to increase tolerance to activities.         10 min Neuromuscular Re-education:  [x]? ?  See flow sheet :emphasis on scapular stability    Rationale: increase ROM, increase strength and increase proprioception  to improve the patients ability to increase ease with overhead reaching.      10 min Manual Therapy:    Supine- B GHJ AP and inferior mobs grade 1-2  Supine - B PROM in all planes   The manual therapy interventions were performed at a separate and distinct time from the therapeutic activities interventions. Rationale: decrease pain, increase ROM, increase tissue extensibility and decrease trigger points to increase ease with ADLs.                    With   [] TE   [] TA   [] neuro   [] other: Patient Education: [x] Review HEP    [] Progressed/Changed HEP based on:   [] positioning   [] body mechanics   [] transfers   [] heat/ice application    [] other:      Other Objective/Functional Measures: MMT right abd 2+/5     Pain Level (0-10 scale) post treatment: right 6; left 1    ASSESSMENT/Changes in Function: Continue to progress B shoulder strength and AROM. Patient will continue to benefit from skilled PT services to modify and progress therapeutic interventions, address functional mobility deficits, address ROM deficits, address strength deficits and analyze and address soft tissue restrictions to attain remaining goals. []  See Plan of Care  []  See progress note/recertification  []  See Discharge Summary         Progress towards goals / Updated goals:  Short Term Goals: To be accomplished in 3 weeks:  1. Patient will subjectively report full compliance with prescribed HEP. PN:  Pt has initiated HEP without full compliance. 11/10/20  2. Patient will demonstrate right shoulder flexion and abduction PROM >/= 150 degrees to improve ease with dressing. PN:  Right shoulder PROM flex 0-140; abd 0-90.  12/1/20220.    3. Patient will demonstrate left//right shoulder flexion AROM >/= 145 deg to improve ease with overhead reach. PN:  left shoulder AROM flexion =156 deg, right shoulder flexion 95 deg. 12/1/2020     Long Term Goals: To be accomplished in 6 weeks:  1. Patient will demonstrate a significant functional improvement as demonstrated by a score of >/= 64 on FOTO. PN: FOTO = 49. 12/1/20     2. Patient will demonstrate right shoulder flexion and abduction MMT >/= 4+/5 to improve ease with functional lifting.   PN: :  Right Shoulder Flexion MMT = 2+/5 (pain), Right Shoulder Abduction MMT = 2+/5 (pain) 12/1/20  3. Patient will demonstrate right shoulder functional ER >/= C2 to improve ease with self-care ADLs.   PN:  Goal met: T1. 12/1/2020       PLAN  []  Upgrade activities as tolerated     [x]  Continue plan of care  []  Update interventions per flow sheet       []  Discharge due to:_  []  Other:_      Leslie Carroll PTA 12/4/2020  9:38 AM    Future Appointments   Date Time Provider Woodrow Drake   12/7/2020 11:45 AM Erin Finder, PTA [facility]   12/11/2020 10:15 AM Erin Finder, PTA [facility]   12/14/2020 11:45 AM Saunders [place], PT [facility]   12/18/2020 10:15 AM Erin Finder, PTA [facility]   12/21/2020 10:15 AM Erin Finder, PTA [facility]   12/28/2020 11:45 AM Saunders [place], PT [facility]   12/31/2020 11:45 AM Saunders [place], PT [facility]

## 2020-12-07 ENCOUNTER — HOSPITAL ENCOUNTER (OUTPATIENT)
Dept: PHYSICAL THERAPY | Age: 45
Discharge: HOME | End: 2020-12-07
Attending: ORTHOPAEDIC SURGERY

## 2020-12-07 PROCEDURE — 97112 NEUROMUSCULAR REEDUCATION: CPT

## 2020-12-07 PROCEDURE — 97110 THERAPEUTIC EXERCISES: CPT

## 2020-12-07 PROCEDURE — 97140 MANUAL THERAPY 1/> REGIONS: CPT

## 2020-12-07 NOTE — PROGRESS NOTES
PT DAILY TREATMENT NOTE     Patient Name: Katiuska Sarabia  Date:2020  : 1975  [x]  Patient  Verified  Payor: [facility] CCN / Plan: [facility] CCN / Product Type: Federal Funded Programs /    In time:11:50  Out time:12:38  Total Treatment Time (min): 48  Visit #: 2 of 10      Treatment Area: Incomplete rotator cuff tear or rupture of right shoulder, not specified as traumatic [M75.111]  Incomplete rotator cuff tear or rupture of left shoulder, not specified as traumatic [M75.112]    SUBJECTIVE  Pain Level (0-10 scale): left 2; right 5  Any medication changes, allergies to medications, adverse drug reactions, diagnosis change, or new procedure performed?: [x] No    [] Yes (see summary sheet for update)  Subjective functional status/changes:   [] No changes reported  Pt reports she feels she can lift her arm and do more with her right arm, but is noticing a catching feeling. OBJECTIVE        28 min Therapeutic Exercise:  [x]? ?? See flow sheet :   Rationale: increase ROM and increase strength to improve the patients ability to increase tolerance to activities.         10 min Neuromuscular Re-education:  [x]? ??  See flow sheet :emphasis on scapular stability    Rationale: increase ROM, increase strength and increase proprioception  to improve the patients ability to increase ease with overhead reaching.      10 min Manual Therapy:    Supine- B GHJ AP and inferior mobs grade 1-2  Supine - B PROM in all planes   The manual therapy interventions were performed at a separate and distinct time from the therapeutic activities interventions. Rationale: decrease pain, increase ROM, increase tissue extensibility and decrease trigger points to increase ease with ADLs.                 With   [] TE   [] TA   [] neuro   [] other: Patient Education: [x] Review HEP    [] Progressed/Changed HEP based on:   [] positioning   [] body mechanics   [] transfers   [] heat/ice application    [] other: Other Objective/Functional Measures: left shoulder AROM flexion 163 deg, right flexion 110 deg with catching. Pain Level (0-10 scale) post treatment: 6    ASSESSMENT/Changes in Function: Pt tolerated exercises better this session without as much pain or catching when performing with right UE. Patient will continue to benefit from skilled PT services to modify and progress therapeutic interventions, address functional mobility deficits, address ROM deficits, address strength deficits and analyze and address soft tissue restrictions to attain remaining goals. []  See Plan of Care  []  See progress note/recertification  []  See Discharge Summary         Progress towards goals / Updated goals:  Short Term Goals: To be accomplished in 3 weeks:  1. Patient will subjectively report full compliance with prescribed HEP. PN:  Pt has initiated HEP without full compliance. 11/10/20  2. Patient will demonstrate right shoulder flexion and abduction PROM >/= 150 degrees to improve ease with dressing. PN:  Right shoulder PROM flex 0-140; abd 0-90.  12/1/20220.    3. Patient will demonstrate left//right shoulder flexion AROM >/= 145 deg to improve ease with overhead reach. PN:  left shoulder AROM flexion =156 deg, right shoulder flexion 95 deg. 12/1/2020  Current: Progressing: left shoulder AROM flexion 163 deg, right flexion 110 deg with catching. 12/7/20     Long Term Goals: To be accomplished in 6 weeks:  1. Patient will demonstrate a significant functional improvement as demonstrated by a score of >/= 64 on FOTO. PN: FOTO = 49. 12/1/20     2. Patient will demonstrate right shoulder flexion and abduction MMT >/= 4+/5 to improve ease with functional lifting. PN: :  Right Shoulder Flexion MMT = 2+/5 (pain), Right Shoulder Abduction MMT = 2+/5 (pain) 12/1/20  3. Patient will demonstrate right shoulder functional ER >/= C2 to improve ease with self-care ADLs.   PN:  Goal met: T1. 12/1/2020       PLAN  [] Upgrade activities as tolerated     [x]  Continue plan of care  []  Update interventions per flow sheet       []  Discharge due to:_  []  Other:_      Antonio Barrios PTA 12/7/2020  12:12 PM    Future Appointments   Date Time Provider Woodrow Drake   12/11/2020 10:15 AM Chyna Cane, PTA [facility]   12/14/2020 11:45 AM Raghavendra Sandy, PT [facility]   12/18/2020 10:15 AM Chyna Cane, PTA [facility]   12/21/2020 10:15 AM Chyna Cane, PTA [facility]   12/28/2020 11:45 AM Raghavendra Sandy, PT [facility]   12/31/2020 11:45 AM Raghavendra Sandy, PT [facility]

## 2020-12-14 ENCOUNTER — HOSPITAL ENCOUNTER (OUTPATIENT)
Dept: PHYSICAL THERAPY | Age: 45
Discharge: HOME | End: 2020-12-14
Attending: ORTHOPAEDIC SURGERY

## 2020-12-14 PROCEDURE — 97110 THERAPEUTIC EXERCISES: CPT | Performed by: PHYSICAL THERAPIST

## 2020-12-14 PROCEDURE — 97112 NEUROMUSCULAR REEDUCATION: CPT | Performed by: PHYSICAL THERAPIST

## 2020-12-14 PROCEDURE — 97140 MANUAL THERAPY 1/> REGIONS: CPT | Performed by: PHYSICAL THERAPIST

## 2020-12-14 NOTE — PROGRESS NOTES
PT DAILY TREATMENT NOTE     Patient Name: Dorota  Date:2020  : 1975  [x]  Patient  Verified  Payor: [facility] / Product Type: Federal Funded Programs /    In time:11:46  Out time:12:41  Total Treatment Time (min): 55  Visit #: 3 of 10    Medicare/BCBS Only   Total Timed Codes (min):  45 1:1 Treatment Time:  45       Treatment Area: Incomplete rotator cuff tear or rupture of right shoulder, not specified as traumatic [M75.111]  Incomplete rotator cuff tear or rupture of left shoulder, not specified as traumatic [M75.112]    SUBJECTIVE  Pain Level (0-10 scale): Right 6; Left 1  Any medication changes, allergies to medications, adverse drug reactions, diagnosis change, or new procedure performed?: [x] No    [] Yes (see summary sheet for update)  Subjective functional status/changes:   [] No changes reported  Patient continues to note right shoulder pain. Left shoulder is doing much better. OBJECTIVE    Modality rationale: decrease pain to improve the patients ability to increase ease of motion to improve function.    Min Type Additional Details    [] Estim:  []Unatt       []IFC  []Premod                        []Other:  []w/ice   []w/heat  Position:  Location:    [] Estim: []Att    []TENS instruct  []NMES                    []Other:  []w/US   []w/ice   []w/heat  Position:  Location:    []  Traction: [] Cervical       []Lumbar                       [] Prone          []Supine                       []Intermittent   []Continuous Lbs:  [] before manual  [] after manual    []  Ultrasound: []Continuous   [] Pulsed                           []1MHz   []3MHz W/cm2:  Location:    []  Iontophoresis with dexamethasone         Location: [] Take home patch   [] In clinic   10 []  Ice     [x]  heat  []  Ice massage  []  Laser   []  Anodyne Position:sitting  Location:both shoulders.    []  Laser with stim  []  Other:  Position:  Location:    [] Vasopneumatic Device Pressure:       [] lo [] med [] hi   Temperature: [] lo [] med [] hi   [] Skin assessment post-treatment:  []intact []redness- no adverse reaction    []redness - adverse reaction:       25 min Therapeutic Exercise:  [] See flow sheet :Emphasis on increasing AROM and strength of bilateral UE   Rationale: increase ROM and increase strength to improve the patients ability to increase her functional activity level. 10 min Neuromuscular Re-education:  []  See flow sheet :Emphasis on activation and recruitment of scapular muscles to improve shoulder stability. Rationale: increase strength, improve coordination and increase proprioception  to improve the patients ability to increase ease with overhead lifting. 10 min Manual Therapy:  B GH mobs grade I, II distraction and inferior glides. Manual stretching to increase elevation and rotation. The manual therapy interventions were performed at a separate and distinct time from the therapeutic activities interventions. Rationale: decrease pain, increase ROM and increase tissue extensibility to increase ease of motion to improve function. With   [] TE   [] TA   [] neuro   [] other: Patient Education: [x] Review HEP    [] Progressed/Changed HEP based on:   [] positioning   [] body mechanics   [] transfers   [] heat/ice application    [] other:      Other Objective/Functional Measures:  Right shoulder PROM flex 0-150; abd 0-95 with pain and empty end feel. Pain Level (0-10 scale) post treatment:  Right 5; Left 0    ASSESSMENT/Changes in Function: Patient continues to have right shoulder pain with limited ROM affecting function. Patient will continue to benefit from skilled PT services to modify and progress therapeutic interventions, address functional mobility deficits, address ROM deficits, address strength deficits and analyze and address soft tissue restrictions to attain remaining goals.      [x]  See Plan of Care  []  See progress note/recertification  []  See Discharge Summary         Progress towards goals / Updated goals:  Short Term Goals: To be accomplished in 3 weeks:  1. Patient will subjectively report full compliance with prescribed HEP. PN:  Pt has initiated HEP without full compliance. 11/10/20  2. Patient will demonstrate right shoulder flexion and abduction PROM >/= 150 degrees to improve ease with dressing. PN:  Right shoulder PROM flex 0-140; abd 0-90.  12/1/20220. Current:  Right shoulder PROM flex 0-150; abd 0-95 with pain and empty end feel. 12/14/2020. Progressing.    3. Patient will demonstrate left//right shoulder flexion AROM >/= 145 deg to improve ease with overhead reach. PN:  left shoulder AROM flexion =156 deg, right shoulder flexion 95 deg. 12/1/2020  Current: Progressing: left shoulder AROM flexion 163 deg, right flexion 110 deg with catching. 12/7/20     Long Term Goals: To be accomplished in 6 weeks:  1. Patient will demonstrate a significant functional improvement as demonstrated by a score of >/= 64 on FOTO. PN: FOTO = 49. 12/1/20     2. Patient will demonstrate right shoulder flexion and abduction MMT >/= 4+/5 to improve ease with functional lifting. PN: :  Right Shoulder Flexion MMT = 2+/5 (pain), Right Shoulder Abduction MMT = 2+/5 (pain) 12/1/20  3. Patient will demonstrate right shoulder functional ER >/= C2 to improve ease with self-care ADLs.   PN:  Goal met: T1. 12/1/2020    PLAN  []  Upgrade activities as tolerated     []  Continue plan of care  []  Update interventions per flow sheet       []  Discharge due to:_  []  Other:_      Amanda Petty PT 12/14/2020  11:48 AM    Future Appointments   Date Time Provider Woodrow Drake   12/18/2020 10:15 AM [facility]   12/21/2020 10[facility]15 AM [facility]   12/28/2020 11:45 AM Ulysses Hoffman, PT [facility]   12/31/2020 11:45 AM Ulysses Hoffman, PT [facility]

## 2020-12-18 ENCOUNTER — APPOINTMENT (OUTPATIENT)
Dept: PHYSICAL THERAPY | Age: 45
End: 2020-12-18
Attending: ORTHOPAEDIC SURGERY

## 2020-12-21 ENCOUNTER — HOSPITAL ENCOUNTER (OUTPATIENT)
Dept: PHYSICAL THERAPY | Age: 45
Discharge: HOME | End: 2020-12-21
Attending: ORTHOPAEDIC SURGERY

## 2020-12-21 PROCEDURE — 97110 THERAPEUTIC EXERCISES: CPT

## 2020-12-21 PROCEDURE — 97112 NEUROMUSCULAR REEDUCATION: CPT

## 2020-12-21 PROCEDURE — 97140 MANUAL THERAPY 1/> REGIONS: CPT

## 2020-12-28 ENCOUNTER — HOSPITAL ENCOUNTER (OUTPATIENT)
Dept: PHYSICAL THERAPY | Age: 45
Discharge: HOME | End: 2020-12-28
Attending: ORTHOPAEDIC SURGERY

## 2020-12-28 PROCEDURE — 97110 THERAPEUTIC EXERCISES: CPT | Performed by: PHYSICAL THERAPIST

## 2020-12-28 PROCEDURE — 97140 MANUAL THERAPY 1/> REGIONS: CPT | Performed by: PHYSICAL THERAPIST

## 2020-12-28 PROCEDURE — 97112 NEUROMUSCULAR REEDUCATION: CPT | Performed by: PHYSICAL THERAPIST

## 2020-12-28 NOTE — PROGRESS NOTES
PT DAILY TREATMENT NOTE     Patient Name: Mariola Calderon  Date:2020  : 1975  [x]  Patient  Verified  Payor: [facility] CCN / Plan: [facility]SI [facility] CCN / Product Type: Federal Funded Programs /    In time:11:46  Out time:12:49  Total Treatment Time (min): 57  Visit #: 5 of 10    Treatment Area: Incomplete rotator cuff tear or rupture of right shoulder, not specified as traumatic [M75.111]  Incomplete rotator cuff tear or rupture of left shoulder, not specified as traumatic [M75.112]    SUBJECTIVE  Pain Level (0-10 scale): Right 6; Left 1  Any medication changes, allergies to medications, adverse drug reactions, diagnosis change, or new procedure performed?: [x] No    [] Yes (see summary sheet for update)  Subjective functional status/changes:   [] No changes reported  Patient reports that her mobility has improved but notes that the soreness seems to remain. States she is more sore today than normal.    OBJECTIVE    Modality rationale: decrease inflammation and decrease pain to improve the patients ability to increase ease of motion to improve function.    Min Type Additional Details    [] Estim:  []Unatt       []IFC  []Premod                        []Other:  []w/ice   []w/heat  Position:  Location:    [] Estim: []Att    []TENS instruct  []NMES                    []Other:  []w/US   []w/ice   []w/heat  Position:  Location:    []  Traction: [] Cervical       []Lumbar                       [] Prone          []Supine                       []Intermittent   []Continuous Lbs:  [] before manual  [] after manual    []  Ultrasound: []Continuous   [] Pulsed                           []1MHz   []3MHz W/cm2:  Location:    []  Iontophoresis with dexamethasone         Location: [] Take home patch   [] In clinic   10 [x]  Ice right    [x]  Heat left  []  Ice massage  []  Laser   []  Anodyne Position: sitting  Location:both shoulders    []  Laser with stim  []  Other:  Position:  Location:    [] Vasopneumatic Device Pressure:       [] lo [] med [] hi   Temperature: [] lo [] med [] hi   [] Skin assessment post-treatment:  []intact []redness- no adverse reaction    []redness - adverse reaction:     24 min Therapeutic Exercise:  [] See flow sheet :Emphasis on increasing AROM and strength of bilateral UE   Rationale: increase ROM and increase strength to improve the patients ability to increase her functional activity level. 12 min Neuromuscular Re-education:  []  See flow sheet :Emphasis on activation and recruitment of scapular muscles to improve shoulder stability. Rationale: increase strength, improve coordination and increase proprioception  to improve the patients ability to increase ease with overhead lifting. 10 min Manual Therapy:  B GH mobs grade I, II distraction and inferior glides. Manual stretching to increase elevation and rotation. The manual therapy interventions were performed at a separate and distinct time from the therapeutic activities interventions. Rationale: decrease pain, increase ROM and increase tissue extensibility to increase ease of motion to improve function. With   [] TE   [] TA   [] neuro   [] other: Patient Education: [x] Review HEP    [] Progressed/Changed HEP based on:   [] positioning   [] body mechanics   [] transfers   [] heat/ice application    [] other:      Other Objective/Functional Measures: AROM right shoulder flex 0-117; left shoulder flex 0-155. There is pain with flex on the right and a catch with lowering her left arm. MMT right shoulder flex and abd 2+/5 with pain. Right shoulder PROM flex 0-160; abd 0-114 with pain and empty end feel. Pain Level (0-10 scale) post treatment: Right 6; Left 0    ASSESSMENT/Changes in Function: Patient continues with painful shoulders with decreased AROM and limited strength affecting her functional abilities.     Patient will continue to benefit from skilled PT services to modify and progress therapeutic interventions, address functional mobility deficits, address ROM deficits, address strength deficits and analyze and address soft tissue restrictions to attain remaining goals. [x]  See Plan of Care  []  See progress note/recertification  []  See Discharge Summary         Progress towards goals / Updated goals:  Short Term Goals: To be accomplished in 3 weeks:  1. Patient will subjectively report full compliance with prescribed HEP. PN:  Pt has initiated HEP without full compliance. 11/10/20  2. Patient will demonstrate right shoulder flexion and abduction PROM >/= 150 degrees to improve ease with dressing. PN:  Right shoulder PROM flex 0-140; abd 0-90.  12/1/20220. Current:  Right shoulder PROM flex 0-160; abd 0-114 with pain and empty end feel.  12/28/2020. Progressing.    3. Patient will demonstrate left//right shoulder flexion AROM >/= 145 deg to improve ease with overhead reach. PN:  left shoulder AROM flexion =156 deg, right shoulder flexion 95 deg. 12/1/2020  Current: Progressing: left shoulder AROM flexion 155 deg with a catch lowering from a flexed position, right flexion 117 deg with pain. 12/28/20     Long Term Goals: To be accomplished in 6 weeks:  1. Patient will demonstrate a significant functional improvement as demonstrated by a score of >/= 64 on FOTO. PN: FOTO = 49. 12/1/20     2. Patient will demonstrate right shoulder flexion and abduction MMT >/= 4+/5 to improve ease with functional lifting. PN: :  Right Shoulder Flexion MMT = 2+/5 (pain), Right Shoulder Abduction MMT = 2+/5 (pain) 12/1/20  Current: Remains  Right Shoulder Flexion MMT = 2+/5 (pain), Right Shoulder Abduction MMT = 2+/5 (pain) 12/28/20  3. Patient will demonstrate right shoulder functional ER >/= C2 to improve ease with self-care ADLs.   PN:  Goal met: T1. 12/1/2020    PLAN  [x]  Upgrade activities as tolerated     [x]  Continue plan of care  []  Update interventions per flow sheet       []  Discharge due to:_  []  Other:_      Ephriam Kayser, PT 12/28/2020  11:43 AM    Future Appointments   Date Time Provider Woodrow Noelle   12/28/2020 11:45 AM Vishnu Virgen, PT [facility]   12/31/2020 11:45 AM Vishnu Virgen PT [facility]   1/13/2021  9:15 AM Анна Villalobos MD MD [facility]

## 2020-12-31 ENCOUNTER — HOSPITAL ENCOUNTER (OUTPATIENT)
Dept: PHYSICAL THERAPY | Age: 45
Discharge: HOME | End: 2020-12-31
Attending: ORTHOPAEDIC SURGERY

## 2020-12-31 PROCEDURE — 97112 NEUROMUSCULAR REEDUCATION: CPT | Performed by: PHYSICAL THERAPIST

## 2020-12-31 PROCEDURE — 97110 THERAPEUTIC EXERCISES: CPT | Performed by: PHYSICAL THERAPIST

## 2020-12-31 PROCEDURE — 97140 MANUAL THERAPY 1/> REGIONS: CPT | Performed by: PHYSICAL THERAPIST

## 2020-12-31 NOTE — PROGRESS NOTES
[facility] - [facility]              [address]        [place], [address]   [phone] fax    Physician Update  [] Progress Note  [] Discharge Summary  Patient name: Lynette Alba Start of Care: 11/3/2020   Referral source: Asia Paula MD : 1975   Medical/Treatment Diagnosis: Incomplete rotator cuff tear or rupture of right shoulder, not specified as traumatic [M75.111]  Incomplete rotator cuff tear or rupture of left shoulder, not specified as traumatic [M75.112]  Payor: Huma Fong / Plan: Nina Bradford / Product Type: Federal Funded Programs /  Onset Date:Left   Right      Prior Hospitalization: see medical history Provider#: [id number]   Medications: Verified on Patient Summary List    Comorbidities: Anxiety, Depression, PTSD, Migraine  Prior Level of Function: LHD, (I) Functional ADLs, (I) Self-Care ADLs, Self-Employed    Visits from Start of Care: 11    Missed Visits: 3    Status at Evaluation/Last Progress Note:   Short Term Goals: To be accomplished in 3 weeks:  1. Patient will subjectively report full compliance with prescribed HEP. Eval: HEP provided  Current: Progressing: Pt has initiated HEP without full compliance. 2. Patient will demonstrate right shoulder flexion and abduction PROM >/= 150 degrees to improve ease with dressing. Eval: Right Shoulder Flexion PROM = 132 deg, Right Shoulder Abduction PROM = 90 deg  Current:Progressing.  Right shoulder PROM flex 0-140; abd 0-90. .    3. Patient will demonstrate left//right shoulder flexion AROM >/= 145 deg to improve ease with overhead reach. Eval: Left Shoulder Flexion AROM = 142 deg, Right Shoulder Flexion AROM = 66 deg  Current: Progressing: left shoulder AROM flexion =156 deg, right shoulder flexion 95 deg.      Long Term Goals: To be accomplished in 6 weeks:  1.  Patient will demonstrate a significant functional improvement as demonstrated by a score of >/= 64 on FOTO. Eval: FOTO = 53   Current; regressed FOTO = 49.   2. Patient will demonstrate right shoulder flexion and abduction MMT >/= 4+/5 to improve ease with functional lifting. Eval: Right Shoulder Flexion MMT = 2+/5 (pain), Right Shoulder Abduction MMT = 2+/5 (pain)  Current: Remains:  Right Shoulder Flexion MMT = 2+/5 (pain), Right Shoulder Abduction MMT = 2+/5 (pain)   3. Patient will demonstrate right shoulder functional ER >/= C2 to improve ease with self-care ADLs. Eval: Right Shoulder Functional ER = Ear  Current: Goal met: T1.      Progress towards Goals:   Short Term Goals: To be accomplished in 3 weeks:  1. Patient will subjectively report full compliance with prescribed HEP. PN:  Pt has initiated HEP without full compliance. 11/10/20  2. Patient will demonstrate right shoulder flexion and abduction PROM >/= 150 degrees to improve ease with dressing. PN:  Right shoulder PROM flex 0-140; abd 0-90.  12/1/20220. Current:  Right shoulder PROM flex 0-160; abd 0-114 with pain and empty end feel.  12/28/2020. Progressing.    3. Patient will demonstrate left//right shoulder flexion AROM >/= 145 deg to improve ease with overhead reach. PN:  left shoulder AROM flexion =156 deg, right shoulder flexion 95 deg. 12/1/2020  Current: Progressing: left shoulder AROM flexion 155 deg with a catch lowering from a flexed position, right flexion 117 deg with pain. 12/28/20     Long Term Goals: To be accomplished in 6 weeks:  1. Patient will demonstrate a significant functional improvement as demonstrated by a score of >/= 64 on FOTO. PN: FOTO = 49. 12/1/20   Current:  FOTO = 57.  12/31/2020. Progressing    2. Patient will demonstrate right shoulder flexion and abduction MMT >/= 4+/5 to improve ease with functional lifting.   PN: :  Right Shoulder Flexion MMT = 2+/5 (pain), Right Shoulder Abduction MMT = 2+/5 (pain) 12/1/20  Current: Remains  Right Shoulder Flexion MMT = 2+/5 (pain), Right Shoulder Abduction MMT = 2+/5 (pain) 12/28/20. No change  3. Patient will demonstrate right shoulder functional ER >/= C2 to improve ease with self-care ADLs. PN:  Goal met: T1. 12/1/2020    Goals: to be achieved in 4 weeks:  Short Term Goals  1. Patient will subjectively report full compliance with prescribed HEP. PN:  Pt has initiated HEP without full compliance. 11/10/20  2. Patient will demonstrate right shoulder flexion and abduction PROM >/= 150 degrees to improve ease with dressing. PN:  Right shoulder PROM flex 0-140; abd 0-90.  12/1/20220. Current:  Right shoulder PROM flex 0-160; abd 0-114 with pain and empty end feel.  12/28/2020. Progressing.       Long Term Goals:   1. Patient will demonstrate a significant functional improvement as demonstrated by a score of >/= 64 on FOTO. PN: FOTO = 49. 12/1/20   Current:  FOTO = 57.  12/31/2020. Progressing    2. Patient will demonstrate right shoulder flexion and abduction MMT >/= 4+/5 to improve ease with functional lifting. PN: :  Right Shoulder Flexion MMT = 2+/5 (pain), Right Shoulder Abduction MMT = 2+/5 (pain) 12/1/20  Current:  Right Shoulder Flexion MMT = 2+/5 (pain), Right Shoulder Abduction MMT = 2+/5 (pain) 12/28/20. Remains. 3. Patient will demonstrate left//right shoulder flexion AROM >/= 145 deg to improve ease with overhead reach. PN:  Left shoulder AROM flexion 155 deg with a catch lowering from a flexed position, right flexion 117 deg with pain. 12/28/20.   Progressing      ASSESSMENT/RECOMMENDATIONS:  [x]Continue therapy per initial plan/protocol at a frequency of  2-3 x per week for 4 weeks  []Continue therapy with the following recommended changes:_____________________      _____________________________________________________________________  []Discontinue therapy progressing towards or have reached established goals  []Discontinue therapy due to lack of appreciable progress towards goals  []Discontinue therapy due to lack of attendance or compliance  []Await Physician's recommendations/decisions regarding therapy  []Other:________________________________________________________________    Thank you for this referral.    Rios Cerrato, PT 12/31/2020 1:17 PM    NOTE TO PHYSICIAN:  PLEASE COMPLETE THE ORDERS BELOW AND   FAX TO [facility]: [phone]  If you are unable to process this request in 24 hours please contact our office: [phone]    []  I have read the above report and request that my patient continue as recommended. []  I have read the above report and request that my patient continue therapy with the following changes/special instructions:________________________________________  []I have read the above report and request that my patient be discharged from therapy.     [de-identified] Signature:____________Date:_________TIME:________    [employer], Date and Time must be completed for valid certification **

## 2020-12-31 NOTE — PROGRESS NOTES
PT DAILY TREATMENT NOTE     Patient Name: Marek Ozuna  Date:2020  : 1975  [x]  Patient  Verified  Payor: [facility] CCN / Plan: [facility]SI [facility] CCN / Product Type: Federal Funded Programs /    In time:11:45  Out time:12:43  Total Treatment Time (min): 58  Visit #: 6 of 10    Treatment Area: Incomplete rotator cuff tear or rupture of right shoulder, not specified as traumatic [M75.111]  Incomplete rotator cuff tear or rupture of left shoulder, not specified as traumatic [M75.112]    SUBJECTIVE  Pain Level (0-10 scale): Right 5; Left 1  Any medication changes, allergies to medications, adverse drug reactions, diagnosis change, or new procedure performed?: [x] No    [] Yes (see summary sheet for update)  Subjective functional status/changes:   [] No changes reported  Patient reports she feels she has more mobility but continues to notes pain with motion. She notes more pain on the right but has a \"catch\" on the left. OBJECTIVE    Modality rationale: decrease pain to improve the patients ability to increase ease of motion to improve function.    Min Type Additional Details    [] Estim:  []Unatt       []IFC  []Premod                        []Other:  []w/ice   []w/heat  Position:  Location:    [] Estim: []Att    []TENS instruct  []NMES                    []Other:  []w/US   []w/ice   []w/heat  Position:  Location:    []  Traction: [] Cervical       []Lumbar                       [] Prone          []Supine                       []Intermittent   []Continuous Lbs:  [] before manual  [] after manual    []  Ultrasound: []Continuous   [] Pulsed                           []1MHz   []3MHz W/cm2:  Location:    []  Iontophoresis with dexamethasone         Location: [] Take home patch   [] In clinic   10 [x]  Ice right    []  Heat left  []  Ice massage  []  Laser   []  Anodyne Position:sitting  Location: both shoulders    []  Laser with stim  []  Other:  Position:  Location:    [] Vasopneumatic Device Pressure:       [] lo [] med [] hi   Temperature: [] lo [] med [] hi   [] Skin assessment post-treatment:  []intact []redness- no adverse reaction    []redness - adverse reaction:       25 min Therapeutic Exercise:  [] See flow sheet :Emphasis on increasing AROM and strength of bilateral UE   Rationale: increase ROM and increase strength to improve the patients ability to increase her functional activity level. 12 min Neuromuscular Re-education:  []  See flow sheet :Emphasis on activation and recruitment of scapular muscles to improve shoulder stability. Rationale: increase strength, improve coordination and increase proprioception  to improve the patients ability to increase ease with overhead lifting. 11 min Manual Therapy:  B GH mobs grade I, II distraction and inferior glides. Manual stretching to increase elevation and rotation. The manual therapy interventions were performed at a separate and distinct time from the therapeutic activities interventions. Rationale: decrease pain, increase ROM and increase tissue extensibility to increase ease of motion to improve function. With   [] TE   [] TA   [] neuro   [] other: Patient Education: [x] Review HEP    [] Progressed/Changed HEP based on:   [] positioning   [] body mechanics   [] transfers   [] heat/ice application    [] other:      Other Objective/Functional Measures: Pain and catch in the left shoulder. Pain in the right shoulder. Pain Level (0-10 scale) post treatment: Right 5; Left 0    ASSESSMENT/Changes in Function: Patient continues with bilateral shoulder pain. She has decreased AROM affecting her functional ability. Patient will continue to benefit from skilled PT services to modify and progress therapeutic interventions, address functional mobility deficits, address ROM deficits, address strength deficits and analyze and address soft tissue restrictions to attain remaining goals.       [x]  See Plan of Care  []  See progress note/recertification  []  See Discharge Summary         Progress towards goals / Updated goals:  Short Term Goals: To be accomplished in 3 weeks:  1. Patient will subjectively report full compliance with prescribed HEP. PN:  Pt has initiated HEP without full compliance. 11/10/20  2. Patient will demonstrate right shoulder flexion and abduction PROM >/= 150 degrees to improve ease with dressing. PN:  Right shoulder PROM flex 0-140; abd 0-90.  12/1/20220. Current:  Right shoulder PROM flex 0-160; abd 0-114 with pain and empty end feel.  12/28/2020. Progressing.    3. Patient will demonstrate left//right shoulder flexion AROM >/= 145 deg to improve ease with overhead reach. PN:  left shoulder AROM flexion =156 deg, right shoulder flexion 95 deg. 12/1/2020  Current: Progressing: left shoulder AROM flexion 155 deg with a catch lowering from a flexed position, right flexion 117 deg with pain. 12/28/20     Long Term Goals: To be accomplished in 6 weeks:  1. Patient will demonstrate a significant functional improvement as demonstrated by a score of >/= 64 on FOTO. PN: FOTO = 49. 12/1/20   Current:  FOTO = 57.  12/31/2020. Progressing    2. Patient will demonstrate right shoulder flexion and abduction MMT >/= 4+/5 to improve ease with functional lifting. PN: :  Right Shoulder Flexion MMT = 2+/5 (pain), Right Shoulder Abduction MMT = 2+/5 (pain) 12/1/20  Current: Remains  Right Shoulder Flexion MMT = 2+/5 (pain), Right Shoulder Abduction MMT = 2+/5 (pain) 12/28/20  3. Patient will demonstrate right shoulder functional ER >/= C2 to improve ease with self-care ADLs.   PN:  Goal met: T1. 12/1/2020    PLAN  [x]  Upgrade activities as tolerated     [x]  Continue plan of care  []  Update interventions per flow sheet       []  Discharge due to:_  []  Other:_      Gerard Wilson, PT 12/31/2020  12:08 PM    Future Appointments   Date Time Provider Woodrow Drake   1/13/2021  9:15 AM Delilah Abernathy MD [facility]

## 2021-01-13 ENCOUNTER — OFFICE VISIT (OUTPATIENT)
Dept: ORTHOPEDIC SURGERY | Age: 46
End: 2021-01-13
Payer: COMMERCIAL

## 2021-01-13 VITALS
BODY MASS INDEX: 28.88 KG/M2 | SYSTOLIC BLOOD PRESSURE: 127 MMHG | DIASTOLIC BLOOD PRESSURE: 78 MMHG | TEMPERATURE: 98.5 F | WEIGHT: 184 LBS | HEIGHT: 67 IN | OXYGEN SATURATION: 98 % | RESPIRATION RATE: 17 BRPM | HEART RATE: 78 BPM

## 2021-01-13 DIAGNOSIS — M75.112 NONTRAUMATIC INCOMPLETE TEAR OF LEFT ROTATOR CUFF: ICD-10-CM

## 2021-01-13 DIAGNOSIS — M75.111 NONTRAUMATIC INCOMPLETE TEAR OF RIGHT ROTATOR CUFF: Primary | ICD-10-CM

## 2021-01-13 PROCEDURE — 99214 OFFICE O/P EST MOD 30 MIN: CPT | Performed by: ORTHOPAEDIC SURGERY

## 2021-01-13 NOTE — PROGRESS NOTES
Patient: Howard Mullins                MRN: [id number]       SSN: [id number]  YOB: 1975        AGE: 48 y.o. SEX: female  Body mass index is 28.82 kg/m². PCP: Doin Figueroa, Not On File  01/13/21    Chief Complaint: Bilateral shoulder pain    Pain 6/10    HPI: Howard Mullins is a 48 y.o. female patient who returns to the office of bilateral shoulder pain. The right shoulder is bothering her more than the left. She has been in physical therapy which has helped with her range of motion but unfortunate she continues to have pain especially with trying to reach overhead and use her arm. The pain is worse on the right than the left. Past Medical History:   Diagnosis Date    Anxiety     Anxiety     Depression     Insomnia     Migraine     PTSD (post-traumatic stress disorder)        Family History   Problem Relation Age of Onset    Cancer Sister         panreatic    Diabetes Mother     Heart Disease Mother        Current Outpatient Medications   Medication Sig Dispense Refill    buPROPion SR (WELLBUTRIN SR) 150 mg SR tablet Take  by mouth two (2) times a day.  ibuprofen (MOTRIN) 600 mg tablet Take 1 tablet by mouth every six (6) hours as needed for Pain. 20 tablet 0    traZODone (DESYREL) 100 mg tablet Take 100 mg by mouth nightly.  QUEtiapine (SEROQUEL) 200 mg tablet Take 200 mg by mouth two (2) times a day.  verapamil (CALAN) 80 mg tablet Take  by mouth three (3) times daily.  gabapentin (NEURONTIN) 400 mg capsule Take 400 mg by mouth three (3) times daily.  sertraline (ZOLOFT) 100 mg tablet Take  by mouth daily.  chlorpheniramine-HYDROcodone (TUSSIONEX PENNKINETIC ER) 8-10 mg/5 mL suspension Take 5 mL by mouth every twelve (12) hours as needed for Cough. 60 mL 0    fexofenadine (ALLEGRA) 60 mg tablet Take 1 tablet by mouth daily.  20 tablet 0       Allergies   Allergen Reactions    Pcn [Penicillins] Angioedema       Past Surgical History: Procedure Laterality Date    HX BREAST BIOPSY      left breast       Social History     Socioeconomic History    Marital status:      Spouse name: Not on file    Number of children: Not on file    Years of education: Not on file    Highest education level: Not on file   Occupational History    Not on file   Social Needs    Financial resource strain: Not on file    Food insecurity     Worry: Not on file     Inability: Not on file    Transportation needs     Medical: Not on file     Non-medical: Not on file   Tobacco Use    Smoking status: Never Smoker    Smokeless tobacco: Never Used   Substance and Sexual Activity    Alcohol use: No    Drug use: No    Sexual activity: Not on file   Lifestyle    Physical activity     Days per week: Not on file     Minutes per session: Not on file    Stress: Not on file   Relationships    Social connections     Talks on phone: Not on file     Gets together: Not on file     Attends Taoism service: Not on file     Active member of club or organization: Not on file     Attends meetings of clubs or organizations: Not on file     Relationship status: Not on file    Intimate partner violence     Fear of current or ex partner: Not on file     Emotionally abused: Not on file     Physically abused: Not on file     Forced sexual activity: Not on file   Other Topics Concern    Not on file   Social History Narrative    Not on file       REVIEW OF SYSTEMS:      No changes from previous review of systems unless noted. PHYSICAL EXAMINATION:  Visit Vitals  /78 (BP 1 Location: Left arm)   Pulse 78   Temp 98.5 °F (36.9 °C)   Resp 17   Ht 5' 7\" (1.702 m)   Wt 184 lb (83.5 kg)   SpO2 98%   BMI 28.82 kg/m²     Body mass index is 28.82 kg/m². GENERAL: Alert and oriented x3, in no acute distress. HEENT: Normocephalic, atraumatic. RESP: Non labored breathing. SKIN: No rashes or lesions noted.    Shoulder Examination     R   L  ROM   FF  Full   Full  ER  Full   Full   IR  Full   Full  Rotator Cuff Pain   Supra  +   +   Infra  -   -   Subscap -   +  Crepitus  -   -  Effusion  -   -  Warmth  -   -   Erythema  -   -  Instability  -   -  AC Joint TTP  -   -  Clavicle   Deformity -   -   TTP  -   -  Proximal Humerus   Deformity -   -   TTP  -   -  Deltoid Strength 5   5  Biceps Strength 5   5  Biceps Deformity -   -  Biceps Groove Pain -   -  Impingement Sign -   -       IMAGING:  No new imaging today    ASSESSMENT & PLAN  Diagnosis: Bilateral high-grade partial-thickness rotator cuff tears    Sarah continues to have symptomatic shoulder pain and bilateral rotator cuff partial-thickness high-grade tears. We discussed treatment options again at length. She has gotten improvement in her range of motion physical therapy but unfortunate continues to have pain that localizes to the rotator cuff. She is interested in surgery. We discussed the surgery as well as the risks and benefits and recovery time. She would like to start the process of moving forward with surgery so we will work on getting that set up for her in the near future. All of her questions were answered.       Electronically signed by: Cassie Duarte MD

## 2021-02-16 NOTE — PROGRESS NOTES
[facility] - [facility]              [address]        [place], [address] [place]   [phone] fax    Discharge Summary  Patient name: Hermilo Markham Start of Care: 11/3/2020   Referral source: Dayo Julian MD : 1975   Medical/Treatment Diagnosis: Incomplete rotator cuff tear or rupture of right shoulder, not specified as traumatic [M75.111]  Incomplete rotator cuff tear or rupture of left shoulder, not specified as traumatic [M75.112]  Payor: [facility] / Plan: Rommel Palma / Product Type: Federal Funded Programs /  Onset Date:Left   Right      Prior Hospitalization: see medical history Provider#: [id number]   Medications: Verified on Patient Summary List    Comorbidities: Anxiety, Depression, PTSD, Migraine  Prior Level of Function: LHD, (I) Functional ADLs, (I) Self-Care ADLs, Self-Employed     Visits from Start of Care: 11    Missed Visits: 3  Reporting Period : 2020 to 2021    Summary of Care:  Short Term Goals  1. Patient will subjectively report full compliance with prescribed HEP. PN:  Pt has initiated HEP without full compliance. 11/10/20  2. Patient will demonstrate right shoulder flexion and abduction PROM >/= 150 degrees to improve ease with dressing. PN:  Right shoulder PROM flex 0-140; abd 0-90.  . Current:  Right shoulder PROM flex 0-160; abd 0-114 with pain and empty end feel.  2020. Progressing.       Long Term Goals:   1. Patient will demonstrate a significant functional improvement as demonstrated by a score of >/= 64 on FOTO. PN: FOTO = 49. 20   Current:  FOTO = 57.  2020. Progressing    2. Patient will demonstrate right shoulder flexion and abduction MMT >/= 4+/5 to improve ease with functional lifting.   PN: :  Right Shoulder Flexion MMT = 2+/5 (pain), Right Shoulder Abduction MMT = 2+/5 (pain) 20  Current:  Right Shoulder Flexion MMT = 2+/5 (pain), Right Shoulder Abduction MMT = 2+/5 (pain) 12/28/20. Remains. 3. Patient will demonstrate left//right shoulder flexion AROM >/= 145 deg to improve ease with overhead reach. PN:  Left shoulder AROM flexion 155 deg with a catch lowering from a flexed position, right flexion 117 deg with pain. 12/28/20. Progressing    ASSESSMENT/RECOMMENDATIONS:  [x]Discontinue therapy: [x]Patient discharged for possible RTC repair surgery.       []Patient is non-compliant or has abdicated      []Due to lack of appreciable progress towards set goals    Mirian Aiken, PT 2/16/2021 8:50 AM

## 2021-04-09 DIAGNOSIS — M75.111 NONTRAUMATIC INCOMPLETE TEAR OF RIGHT ROTATOR CUFF: Primary | ICD-10-CM

## 2021-04-16 ENCOUNTER — OFFICE VISIT (OUTPATIENT)
Dept: ORTHOPEDIC SURGERY | Age: 46
End: 2021-04-16

## 2021-04-16 VITALS
HEIGHT: 67 IN | HEART RATE: 74 BPM | WEIGHT: 182 LBS | OXYGEN SATURATION: 99 % | SYSTOLIC BLOOD PRESSURE: 127 MMHG | BODY MASS INDEX: 28.56 KG/M2 | TEMPERATURE: 98.2 F | DIASTOLIC BLOOD PRESSURE: 79 MMHG

## 2021-04-16 DIAGNOSIS — Z01.818 PRE-OP EXAM: ICD-10-CM

## 2021-04-16 DIAGNOSIS — M75.111 NONTRAUMATIC INCOMPLETE TEAR OF RIGHT ROTATOR CUFF: Primary | ICD-10-CM

## 2021-04-16 RX ORDER — IBUPROFEN 200 MG
CAPSULE ORAL DAILY
COMMUNITY

## 2021-04-16 NOTE — PROGRESS NOTES
HISTORY AND PHYSICAL          Patient: Enmanuel Ceron                MRN: [id number]       SSN: [id number]  YOB: 1975          AGE: 48 y.o. SEX: female      Patient scheduled for:  RIGHT SHOULDER ARTHROSCOPIC ROTATOR CUFFREPAIR    Surgeon: Pavel Otoole MD    ANESTHESIA TYPE:  General    HISTORY:     The patient was seen in the office today for a preoperative history and physical for an upcoming above listed surgery. The patient is a pleasant 48 y.o. female who has a history of bilateral shoulder pain. The right shoulder is bothering her more than the left. She has been in physical therapy which has helped with her range of motion but unfortunate she continues to have pain especially with trying to reach overhead and use her arm. The pain is worse on the right than the left. Due to the current findings, affected activity of daily living and continued pain and discomfort, surgical intervention is indicated. The alternatives, risks, and complications, including but not limited to infection, blood loss, need for blood transfusion, neurovascular damage, gladys-incisional numbness, subcutaneous hematoma, bone fracture, anesthetic complications, DVT, PE, death, RSD, postoperative stiffness and pain, possible surgical scar, delayed healing and nonhealing, reflexive sympathetic dystrophy, damage to blood vessels and nerves, need for more surgery, MI, and stroke,  failure of hardware, gait disturbances,have been discussed. The patient understands and wishes to proceed with surgery. PAST MEDICAL HISTORY:     Past Medical History:   Diagnosis Date    Anxiety     Anxiety     Depression     Insomnia     Migraine     PTSD (post-traumatic stress disorder)        CURRENT MEDICATIONS:     Current Outpatient Medications   Medication Sig Dispense Refill    calcium citrate 200 mg (950 mg) tablet Take  by mouth daily.  FERROUS SULFATE PO Take 1 Tab by mouth two (2) times a day.  QUEtiapine (SEROQUEL) 200 mg tablet Take 200 mg by mouth two (2) times a day.  verapamil (CALAN) 80 mg tablet Take  by mouth three (3) times daily.  gabapentin (NEURONTIN) 400 mg capsule Take 400 mg by mouth three (3) times daily.  buPROPion SR (WELLBUTRIN SR) 150 mg SR tablet Take  by mouth two (2) times a day.  sertraline (ZOLOFT) 100 mg tablet Take  by mouth daily.  chlorpheniramine-HYDROcodone (TUSSIONEX PENNKINETIC ER) 8-10 mg/5 mL suspension Take 5 mL by mouth every twelve (12) hours as needed for Cough. 60 mL 0    fexofenadine (ALLEGRA) 60 mg tablet Take 1 tablet by mouth daily. 20 tablet 0    ibuprofen (MOTRIN) 600 mg tablet Take 1 tablet by mouth every six (6) hours as needed for Pain. 20 tablet 0    traZODone (DESYREL) 100 mg tablet Take 100 mg by mouth nightly. ALLERGIES:     Allergies   Allergen Reactions    Pcn [Penicillins] Angioedema         SURGICAL HISTORY:     Past Surgical History:   Procedure Laterality Date    HX BREAST BIOPSY      left breast       SOCIAL HISTORY:     Social History     Socioeconomic History    Marital status:      Spouse name: Not on file    Number of children: Not on file    Years of education: Not on file    Highest education level: Not on file   Tobacco Use    Smoking status: Never Smoker    Smokeless tobacco: Never Used   Substance and Sexual Activity    Alcohol use: No    Drug use: No       FAMILY HISTORY:     Family History   Problem Relation Age of Onset    Cancer Sister         panreatic    Diabetes Mother     Heart Disease Mother        REVIEW OF SYSTEMS:     Negative for fevers, chills, chest pain, shortness of breath, weight loss, recent illness     General: Negative for fever and chills. No unexpected change in weight. Denies fatigue. No change in appetite. Skin: Negative for rash or itching. HEENT: Negative for congestion, sore throat, neck pain and neck stiffness. No change in vision or hearing. Hasn't noted any enlarged lymph nodes in the neck. Cardiovascular:  Negative for chest pain and palpitations. Has not noted pedal edema. Respiratory: Negative for cough, colds, sinus, hemoptysis, shortness of breath and wheezing. Gastrointestinal: Negative for nausea and vomiting, rectal bleeding, coffee ground emesis, abdominal pain, diarrhea and constipation. Genitourinary: Negative for dysuria, frequency urgency, or burning on micturition. No flank pain, no foul smelling urine, no difficulty with initiating urination. Hematological: Negative for bleeding or easy bruising. Musculoskeletal: Negative  for arthralgias, back pain or neck pain. Neurological: Negative for dizziness, seizures or syncopal episodes. Denies headaches. Endocrine: Denies excessive thirst.  No heat/cold intolerance. Psychiatric: Negative for depression or insomnia. PHYSICAL EXAMINATION:     VITALS: There were no vitals taken for this visit. GEN:  Well developed, well nourished 48 y.o. female in no acute distress. HEENT: Normocephalic and atraumatic. Eyes: Conjunctivae and EOM are normal.Pupils are equal, round, and reactive to light. External ear normal appearance, external nose normal appearing. Mouth/Throat: Oropharynx is clear and moist, able to handle oral secretions w/out difficulty, airway patent  NECK: Supple. Normal ROM, No lymphadenopathy. Trachea is midline. No bruising, swelling or deformity  RESP: Clear to auscultation bilaterally. No wheezes, rales, rhonchi. Normal effort and breath sounds. No respiratory distress  CARDIO: Normal rate, regular rhythm and normal heart sounds. No MGR. ABDOMEN: Soft, non-tender, non-distended, normoactive bowel sounds in all four quadrants. There is no tenderness. There is no rebound and no guarding.    BACK: No CVA or spinal tenderness  BREAST:  Deferred  PELVIC:    Deferred   RECTAL:  Deferred   :           Deferred  EXTREMITIES: EXAMINATION OF: right shoulder  Shoulder Examination                                      R                                  L  ROM              FF                    Full                              Full  ER                   Full                              Full              IR                     Full                              Full  Rotator Cuff Pain              Supra               +                                  +              Infra                 -                                   -              Subscap          -                                   +  Crepitus                       -                                   -  Effusion                       -                                   -  Warmth                       -                                   -             Erythema                     -                                   -  Instability                     -                                   -  AC Joint TTP               -                                   -  Clavicle              Deformity         -                                   -              TTP                 -                                   -  Proximal Humerus              Deformity         -                                   -              TTP                 -                                   -  Deltoid Strength          5                                  5  Biceps Strength          5                                  5  Biceps Deformity         -                                   -  Biceps Groove Pain    -                                   -  Impingement Sign       -                                   -             NEUROVASCULAR: Sensation intact to light touch and strength grossly intact and symmetrical. No nystagmus. Positive distal pulses and capillary refill. DVT ASSESSMENT:  There is not  calf tenderness. No evidence of DVT seen on physical exam.  MOTOR: In tact  PSYCH: Alert an oriented to person, place and time.  Mood, memory, affect, behavior and judgment normal       RADIOGRAPHS & DIAGNOSTIC STUDIES:     MRI/xray reveals :     X-rays of both shoulders previously taken were reviewed in the office today. These show no significant bony abnormalities     An MRI of the right shoulder was reviewed which shows high-grade partial-thickness tear of the supraspinatus. No muscle atrophy or full-thickness tears are noted. No tearing of the biceps is noted. ASSESSMENT:       Encounter Diagnoses   Name Primary?  Nontraumatic incomplete tear of right rotator cuff Yes    Pre-op exam        PLAN:     Again, the alternatives, risks, and complications, as well as expected outcome were discussed. The patient understands and agrees to proceed with RIGHT SHOULDER ARTHROSCOPIC ROTATOR CUFF REPAIR.  Patient given orders listed below:    Orders Placed This Encounter    calcium citrate 200 mg (950 mg) tablet    FERROUS SULFATE PO         Gregory Roberson PA-C  4/16/2021  10:19 AM

## 2021-04-22 DIAGNOSIS — G89.18 PAIN FOLLOWING SURGERY OR PROCEDURE: ICD-10-CM

## 2021-04-22 DIAGNOSIS — G89.18 PAIN FOLLOWING SURGERY OR PROCEDURE: Primary | ICD-10-CM

## 2021-04-22 RX ORDER — OXYCODONE AND ACETAMINOPHEN 5; 325 MG/1; MG/1
2 TABLET ORAL
Qty: 35 TAB | Refills: 0 | Status: SHIPPED | OUTPATIENT
Start: 2021-04-22 | End: 2021-04-28 | Stop reason: SDUPTHER

## 2021-04-22 RX ORDER — OXYCODONE AND ACETAMINOPHEN 5; 325 MG/1; MG/1
2 TABLET ORAL
Qty: 35 TAB | Refills: 0 | Status: SHIPPED | OUTPATIENT
Start: 2021-04-22 | End: 2021-04-22 | Stop reason: SDUPTHER

## 2021-04-22 NOTE — TELEPHONE ENCOUNTER
Called and spoke to [facility]. They state that they cannot fill it for 2 tabs every 6 hrs. It would have to be changed to 2 tab every 8 hours. I asked Arlin Orantes PA-C if he would sign a new rx for this post-op patient as Tobias Ríos PA-C is out of the office. He was agreeable to signing this one time rx.     Rx was routed to [facility] to sign

## 2021-04-22 NOTE — TELEPHONE ENCOUNTER
Patient's spouse called to state the patient's pharmacy has the medication : oxyCODONE-acetaminophen (Percocet) 5-325 mg  on hold because the directions are unclear, the pharmacy is requesting a new rx to be sent.     Please advise pharmacy on file at [phone]     Patient's spouse at [phone]

## 2021-04-23 ENCOUNTER — TELEPHONE (OUTPATIENT)
Dept: ORTHOPEDIC SURGERY | Age: 46
End: 2021-04-23

## 2021-04-23 DIAGNOSIS — G89.18 PAIN FOLLOWING SURGERY OR PROCEDURE: Primary | ICD-10-CM

## 2021-04-23 DIAGNOSIS — G89.18 PAIN FOLLOWING SURGERY OR PROCEDURE: ICD-10-CM

## 2021-04-23 RX ORDER — HYDROCODONE BITARTRATE AND ACETAMINOPHEN 10; 325 MG/1; MG/1
1 TABLET ORAL
Qty: 30 TAB | Refills: 0 | Status: SHIPPED | OUTPATIENT
Start: 2021-04-23 | End: 2021-04-23 | Stop reason: SDUPTHER

## 2021-04-23 RX ORDER — HYDROCODONE BITARTRATE AND ACETAMINOPHEN 10; 325 MG/1; MG/1
1 TABLET ORAL
Qty: 28 TAB | Refills: 0 | Status: SHIPPED | OUTPATIENT
Start: 2021-04-23 | End: 2021-04-30

## 2021-04-23 NOTE — TELEPHONE ENCOUNTER
[facility] called to verify providers are aware and ok with the script being sent yesterday for Percocet and now a new script for Norco.  She is asking is the Norco replacing the Perocet? Please return call to pharmacy.   Being non-clinical, I wanted to have this clarification by someone clinical.  [phone]

## 2021-04-23 NOTE — TELEPHONE ENCOUNTER
Patient called stating her rx oxyCODONE-acetaminophen (Percocet) 5-325 mg per tablet is not working, and she is still having pain. She is requesting a different rx be sent to [facility] [address], however if it is too expensive she would like it sent through the [place]. She would like a call from clinical to discuss medications. Patient can be reached at [phone].

## 2021-04-23 NOTE — TELEPHONE ENCOUNTER
Patient's  Talat Duarte called regarding this message. I informed him we're waiting on the change to be made to the script and when it's completed we will be letting them know. Please advise patient back when completed at [phone].

## 2021-04-23 NOTE — TELEPHONE ENCOUNTER
Please clarify patient was sent a Percocet prescription yesterday by JAMES Santoyo and now a hydrocodone prescription by Dr. Arun Pascal. What is the prescription the patient is looking to obtain?

## 2021-04-23 NOTE — TELEPHONE ENCOUNTER
Called and spoke to patient. Advised that Dr Kathleen Magaña and Yessi Aragon are both out of office and wouldn't be able to address until Monday. I advised that she can take 2 of the Percocet every 4 hours to get ahead of the pain until Monday. She asked if there was any one in the office that could sign a new rx. I advised that I would try to send to the on-call provider's PA to see if they are comfortable sending in a new rx. She requested that I do that and let her know what the decision is.

## 2021-04-23 NOTE — TELEPHONE ENCOUNTER
Rx for Norco 10 sent in to [facility] [address]    I am unable to send Rx to [place]

## 2021-04-28 ENCOUNTER — OFFICE VISIT (OUTPATIENT)
Dept: ORTHOPEDIC SURGERY | Age: 46
End: 2021-04-28
Payer: OTHER GOVERNMENT

## 2021-04-28 VITALS
BODY MASS INDEX: 28.56 KG/M2 | OXYGEN SATURATION: 99 % | HEIGHT: 67 IN | HEART RATE: 72 BPM | WEIGHT: 182 LBS | TEMPERATURE: 98.2 F

## 2021-04-28 DIAGNOSIS — M75.111 NONTRAUMATIC INCOMPLETE TEAR OF RIGHT ROTATOR CUFF: Primary | ICD-10-CM

## 2021-04-28 DIAGNOSIS — G89.18 POST-OP PAIN: ICD-10-CM

## 2021-04-28 DIAGNOSIS — Z98.890 STATUS POST ARTHROSCOPY OF RIGHT SHOULDER: ICD-10-CM

## 2021-04-28 DIAGNOSIS — G89.18 PAIN FOLLOWING SURGERY OR PROCEDURE: ICD-10-CM

## 2021-04-28 PROCEDURE — 99024 POSTOP FOLLOW-UP VISIT: CPT | Performed by: ORTHOPAEDIC SURGERY

## 2021-04-28 RX ORDER — OXYCODONE AND ACETAMINOPHEN 5; 325 MG/1; MG/1
1 TABLET ORAL
Qty: 40 TAB | Refills: 0 | Status: SHIPPED | OUTPATIENT
Start: 2021-04-28 | End: 2021-05-08

## 2021-04-28 RX ORDER — ONDANSETRON 4 MG/1
4 TABLET, ORALLY DISINTEGRATING ORAL
Qty: 30 TAB | Refills: 0 | Status: SHIPPED | OUTPATIENT
Start: 2021-04-28 | End: 2021-07-28

## 2021-04-28 RX ORDER — ONDANSETRON 4 MG/1
4 TABLET, ORALLY DISINTEGRATING ORAL
Qty: 30 TAB | Refills: 0 | Status: SHIPPED | OUTPATIENT
Start: 2021-04-28 | End: 2021-04-28 | Stop reason: SDUPTHER

## 2021-04-28 RX ORDER — OXYCODONE AND ACETAMINOPHEN 5; 325 MG/1; MG/1
1 TABLET ORAL
Qty: 35 TAB | Refills: 0 | Status: SHIPPED | OUTPATIENT
Start: 2021-04-28 | End: 2021-04-28 | Stop reason: SDUPTHER

## 2021-05-17 ENCOUNTER — HOSPITAL ENCOUNTER (OUTPATIENT)
Dept: PHYSICAL THERAPY | Age: 46
Discharge: HOME | End: 2021-05-17
Attending: ORTHOPAEDIC SURGERY
Payer: OTHER GOVERNMENT

## 2021-05-17 PROCEDURE — 97110 THERAPEUTIC EXERCISES: CPT | Performed by: PHYSICAL THERAPIST

## 2021-05-17 PROCEDURE — 97140 MANUAL THERAPY 1/> REGIONS: CPT | Performed by: PHYSICAL THERAPIST

## 2021-05-17 PROCEDURE — 97162 PT EVAL MOD COMPLEX 30 MIN: CPT | Performed by: PHYSICAL THERAPIST

## 2021-05-17 NOTE — PROGRESS NOTES
[facility] - [facility]              [address]        [place], [address] [place]   [phone] fax    Plan of Care/ Statement of Necessity for Physical Therapy Services  Patient name: Barbera Mcardle Start of Care: 2021   Referral source: Nikko Cohn : 1975    Medical Diagnosis: Pain in right shoulder [M25.511]  Payor: [facility] CCN / Plan: Frederick Boyd / Product Type: Federal Funded Programs /  Onset Date:2021 DOS    Treatment Diagnosis: Right shoulder pain   Prior Hospitalization: see medical history Provider#: [id number]   Medications: Verified on Patient summary List    Comorbidities: Allergies, Anxiety or Panic Disorders, Back pain, Depression, Headaches, Prior  Surgery, Prosthesis / Implants, Sleep dysfunction   Prior Level of Function: She had limited ROM and painful right shoulder prior to surgery. Independent self care. The Plan of Care and following information is based on the information from the initial evaluation. Assessment/ key information: Patient with signs and symptoms consistent with right shoulder pain following rotator cuff repair with biceps tenodesis. She states she was told she could discontinue the sling in 2 weeks but she could start coming out of it. Advised patient to wear her sling except for PROM exercises and PT. Patient does have c/o pain with limited ROM and functional use of the right shoulder. Additionally, she has limited left shoulder AROM with pain which she states is due to needing a rotator cuff repair. Patient will benefit from a program of skilled physical therapy to include therapeutic exercises to address strength deficits, therapeutic activities to improve functional mobility, neuromuscular reeducation to address balance, coordination and proprioception, manual therapy to address ROM and tissue extensibility and modalities as indicated.   All questions were answered. Evaluation Complexity History HIGH Complexity :3+ comorbidities / personal factors will impact the outcome/ POC ; Examination MEDIUM Complexity : 3 Standardized tests and measures addressing body structure, function, activity limitation and / or participation in recreation  ;Presentation MEDIUM Complexity : Evolving with changing characteristics  ; Clinical Decision Making MEDIUM Complexity : FOTO score of 26-74  Overall Complexity Rating: MEDIUM  Problem List: pain affecting function, decrease ROM, decrease strength, decrease ADL/ functional abilitiies, decrease activity tolerance and decrease flexibility/ joint mobility   Treatment Plan may include any combination of the following: Therapeutic exercise, Therapeutic activities, Neuromuscular re-education, Physical agent/modality and Manual therapy  Patient / Family readiness to learn indicated by: asking questions, trying to perform skills and interest  Persons(s) to be included in education: patient (P)  Barriers to Learning/Limitations: None  Patient Goal (s): The ROM and to use it and move it  Patient Self Reported Health Status: fair  Rehabilitation Potential: good    Short Term Goals: To be accomplished in 1 weeks:  1. Patient will become proficient in their HEP and will be compliant in performing that program.  Evaluation:   Patient given a written/illustrated HEP. 2.  Patient will demonstrate PROM right shoulder flex 0-120; scaption 0-100 degrees. Evaluation:  PROM right shoulder flexion 0-85; scaption 0-65 degrees. Long Term Goals: To be accomplished in 4 weeks:  1. Patient's pain level will be 2-3/10 with activity in order to improve patient's ability to perform normal ADLs. Evaluation: 5/10-8/10  2. Patient will demonstrate PROM right shoulder flex 0-160, scaption 0-140, IR 0-60, ER 0-80 to increase ease of ADLs. Evaluation:  PROM right shoulder flexion 0-85; scaption 0-65.   3. Patient will increase FOTO score to 62 to indicate increased functional mobility. Evaluation:  34  4. Patient will be able to reach to the bottom shelf of a kitchen cabinet in order to perform normal ADLs. Evaluation:  NT, not within the protocol at this time. Frequency / Duration: Patient to be seen 2-3 times per week for 4 weeks. Patient/ Caregiver education and instruction: Diagnosis, prognosis, exercises   [x]  Plan of care has been reviewed with GINGER Rooney, PT 5/17/2021 7:12 AM  ________________________________________________________________________    I certify that the above Therapy Services are being furnished while the patient is under my care. I agree with the treatment plan and certify that this therapy is necessary.     [de-identified] Signature:____________Date:_________TIME:________     JAMES Pham  ** Signature, Date and Time must be completed for valid certification **  Please sign and return to [facility] - [address] [place], [address] [place]   [phone] fax

## 2021-05-17 NOTE — PROGRESS NOTES
PT DAILY TREATMENT NOTE/SHOULDER EVAL     Patient Name: Dorota  Date:2021  : 1975  [x]  Patient  Verified  Payor: [facility] CCN / Plan: [facility] CCN / Product Type: Federal Funded Programs /    In time:10:17  Out time:11:07  Total Treatment Time (min): 50  Visit #: 1 of 12    Treatment Area: Pain in right shoulder [M25.511]    SUBJECTIVE  Pain Level (0-10 scale): 6/10 now; 5/10 at best; 8/10 at worst.  [x]constant []intermittent []improving []worsening [x]no change since onset    Any medication changes, allergies to medications, adverse drug reactions, diagnosis change, or new procedure performed?: [x] No    [] Yes (see summary sheet for update)  Subjective functional status/changes:     PLOF: She had limited ROM and painful right shoulder prior to surgery. Independent self care. Limitations to PLOF: She is weaning from the sling her per instructions from her orthopedics. She is not using it but she is driving some. Needs some assistance with daily ADLs. Mechanism of Injury: RTC repair on 2021. Current symptoms/Complaints: Patient notes constant right shoulder pain with limited use of her right arm. She needs help with ADLs. She is not doing much at home. Picks up some stuff with the left. Avoids use of right. Previous Treatment/Compliance: None. PMHx/Surgical Hx: Surgery on 2021. Work Hx: Administrative. Pt Goals: \"The ROM and to use it and move it\". Barriers: [x]pain []financial []time []transportation []other  Cognition: A & O x 3    Other:    OBJECTIVE/EXAMINATION  Domestic Life: Lives with her . Activity/Recreational Limitations: Limited use of the right arm. Mobility: ambulates without deviation. Self Care: Needs help with dressing and bathing. Modality rationale: decrease inflammation and decrease pain to improve the patients ability to become more comfortable following therapy.    Min Type Additional Details    [] Estim:  []Unatt       []IFC  []Premod                        []Other:  []w/ice   []w/heat  Position:  Location:    [] Estim: []Att    []TENS instruct  []NMES                    []Other:  []w/US   []w/ice   []w/heat  Position:  Location:    []  Traction: [] Cervical       []Lumbar                       [] Prone          []Supine                       []Intermittent   []Continuous Lbs:  [] before manual  [] after manual    []  Ultrasound: []Continuous   [] Pulsed                           []1MHz   []3MHz Location:  W/cm2:    []  Iontophoresis with dexamethasone         Location: [] Take home patch   [] In clinic   10 [x]  Ice     []  heat  []  Ice massage  []  Laser   []  Anodyne Position:sitting  Location: right shoulder    []  Laser with stim  []  Other: Position:  Location:    []  Vasopneumatic Device Pressure:       [] lo [] med [] hi   Temperature: [] lo [] med [] hi   [] Skin assessment post-treatment:  []intact []redness- no adverse reaction    []redness - adverse reaction:     15 min [x]Eval                  []Re-Eval       10 min Therapeutic Exercise:  [] See flow sheet :Emphasis on PROM right shoulder and elbow; increase strength right wrist and hand. Rationale: increase ROM and increase strength to improve the patients ability to increase tolerance to activity. 15 min Manual Therapy:  Emphasis on increasing PROM right shoulder and elbow. STM right scapula; sidelying scapular mobs, grade II   The manual therapy interventions were performed at a separate and distinct time from the therapeutic activities interventions. Rationale: decrease pain, increase ROM and increase tissue extensibility to increase ease of motion to improve function.         With   [] TE   [] TA   [] neuro   [] other: Patient Education: [x] Review HEP    [] Progressed/Changed HEP based on:   [] positioning   [] body mechanics   [] transfers   [] heat/ice application    [] other:      Other Objective/Functional Measures:     Physical Therapy Evaluation - Shoulder    Posture: [] Poor    [] Fair    [x] Good    Describe:    ROM:  [] Unable to assess at this time                                           AROM                                                              PROM   Left Right  Left Right   Flexion 0-160  Flexion  0-85   Extension   Extension     Scaption/ABD 0-70  Scaption/ABD  0-65   ER @ 0 Degrees   ER @ 0 Degrees     ER @ 90 Degrees 0-70  ER @ 90 Degrees     IR @ 90 Degrees 0-40  IR @ 90 Degrees       End Feel / Painful Arc:    Strength:   [x] Unable to assess at this time                                                                            L (1-5) R (1-5) Pain   Flexors   [] Yes   [] No   Abductors   [] Yes   [] No   External Rotators   [] Yes   [] No   Internal Rotators   [] Yes   [] No   Supraspinatus   [] Yes   [] No   Serratus Anterior   [] Yes   [] No   Lower Trapezius   [] Yes   [] No   Elbow Flexion   [] Yes   [] No   Elbow Extension   [] Yes   [] No       Scapulohumoral Control / Rhythm:  Able to eccentrically lower with good control? Left: [] Yes   [x] No     Right: [] Yes   [x] No    Palpation  [] Min  [x] Mod  [] Severe    Location: right shoulder  [] Min  [x] Mod  [] Severe    Location: medial border right scapula    Optional Tests:  Deferred at this time. Adson's Test  [] Pos   [] Neg Yergason's Test [] Pos   [] Neg  Estela's Test  [] Pos   [] Neg Weld's Sign [] Pos   [] Neg  Neer's Test  [] Pos   [] Neg Clunk Test  [] Pos   [] Neg  Hawkin's Test  [] Pos   [] Neg AC Joint  [] Pos   [] Neg  Speed's Test  [] Pos   [] Neg SC Joint  [] Pos   [] Neg  Empty Can  [] Pos   [] Neg Pectoral Tightness [] Pos   [] Neg  Anterior Apprehension [] Pos   [] Neg   Posterior Apprehension [] Pos   [] Neg    Pain Level (0-10 scale) post treatment: 4    ASSESSMENT/Changes in Function: Patient with signs and symptoms consistent with right shoulder pain following rotator cuff repair with biceps tenodesis.   She states she was told she could discontinue the sling in 2 weeks but she could start coming out of it. Advised patient to wear her sling except for PROM exercises and PT. Patient does have c/o pain with limited ROM and functional use of the right shoulder. Additionally, she has limited left shoulder AROM with pain which she states is due to needing a rotator cuff repair. Patient will continue to benefit from skilled PT services to modify and progress therapeutic interventions, address functional mobility deficits, address ROM deficits, address strength deficits, analyze and address soft tissue restrictions, analyze and cue movement patterns, analyze and modify body mechanics/ergonomics and assess and modify postural abnormalities to attain remaining goals. [x]  See Plan of Care  []  See progress note/recertification  []  See Discharge Summary         Progress towards goals / Updated goals:  Short Term Goals: To be accomplished in 1 weeks:  1. Patient will become proficient in their HEP and will be compliant in performing that program.  Evaluation:   Patient given a written/illustrated HEP. 2.  Patient will demonstrate PROM right shoulder flex 0-120; scaption 0-100 degrees. Evaluation:  PROM right shoulder flexion 0-85; scaption 0-65 degrees.     Long Term Goals: To be accomplished in 4 weeks:  1. Patient's pain level will be 2-3/10 with activity in order to improve patient's ability to perform normal ADLs. Evaluation: 5/10-8/10  2. Patient will demonstrate PROM right shoulder flex 0-160, scaption 0-140, IR 0-60, ER 0-80 to increase ease of ADLs. Evaluation:  PROM right shoulder flexion 0-85; scaption 0-65. 3. Patient will increase FOTO score to 62 to indicate increased functional mobility. Evaluation:  34  4. Patient will be able to reach to the bottom shelf of a kitchen cabinet in order to perform normal ADLs. Evaluation:  NT, not within the protocol at this time.     PLAN  [x]  Upgrade activities as tolerated     [x] Continue plan of care  []  Update interventions per flow sheet       []  Discharge due to:_  []  Other:_      Sheri Donato, PT 5/17/2021  7:21 AM

## 2021-05-20 ENCOUNTER — HOSPITAL ENCOUNTER (OUTPATIENT)
Dept: PHYSICAL THERAPY | Age: 46
Discharge: HOME | End: 2021-05-20
Attending: ORTHOPAEDIC SURGERY
Payer: OTHER GOVERNMENT

## 2021-05-20 PROCEDURE — 97110 THERAPEUTIC EXERCISES: CPT

## 2021-05-20 PROCEDURE — 97140 MANUAL THERAPY 1/> REGIONS: CPT

## 2021-05-20 NOTE — PROGRESS NOTES
PT DAILY TREATMENT NOTE     Patient Name: Dorota  Date:2021  : 1975  [x]  Patient  Verified  Payor: [facility] / Plan: [facility] CCN / Product Type: Federal Funded Programs /    In time:8:48  Out time:9:31  Total Treatment Time (min): 43  Visit #: 2 of 12      Treatment Area: Pain in right shoulder [M25.511]    SUBJECTIVE  Pain Level (0-10 scale): 4  Any medication changes, allergies to medications, adverse drug reactions, diagnosis change, or new procedure performed?: [x] No    [] Yes (see summary sheet for update)  Subjective functional status/changes:   [] No changes reported  Patient reports that last night she did not sleep in the sling due to not feeling well. OBJECTIVE    Modality rationale: decrease pain to improve the patients ability to decrease difficulty while performing tasks. Min Type Additional Details   10 [x]  Ice     []  heat  []  Ice massage  []  Laser   []  Anodyne Position:sitting  Location:shoulder   [] Skin assessment post-treatment:  []intact []redness- no adverse reaction    []redness - adverse reaction:       23 min Therapeutic Exercise:  [x] See flow sheet :   Rationale: increase ROM and increase strength to improve the patients ability to increase ease with ADLS. 10 min Manual Therapy:    Supine-elbow PROM flexion/extension/pronoation/supination  Supine- shoulder PROM per protocol. The manual therapy interventions were performed at a separate and distinct time from the therapeutic activities interventions. Rationale: decrease pain, increase ROM, increase tissue extensibility and decrease trigger points to increase ease with dressing.            With   [] TE   [] TA   [] neuro   [] other: Patient Education: [x] Review HEP    [] Progressed/Changed HEP based on:   [] positioning   [] body mechanics   [] transfers   [] heat/ice application    [] other:      Other Objective/Functional Measures: Progressing: Patient has initiated HEP. Pain Level (0-10 scale) post treatment: 1    ASSESSMENT/Changes in Function: Initiated exercises per POC. Educated patient on importance of wearing and sleeping in the sling. Patient will continue to benefit from skilled PT services to modify and progress therapeutic interventions, address functional mobility deficits, address ROM deficits, address strength deficits and analyze and address soft tissue restrictions to attain remaining goals. []  See Plan of Care  []  See progress note/recertification  []  See Discharge Summary         Progress towards goals / Updated goals:  Short Term Goals: To be accomplished in 1 weeks:  1.  Patient will become proficient in their HEP and will be compliant in performing that program.  Evaluation:   Patient given a written/illustrated HEP. Current: Progressing: Patient has initiated HEP. 5/20/21   2.  Patient will demonstrate PROM right shoulder flex 0-120; scaption 0-100 degrees. Evaluation:  PROM right shoulder flexion 0-85; scaption 0-65 degrees.     Long Term Goals: To be accomplished in 4 weeks:  1. Patient's pain level will be 2-3/10 with activity in order to improve patient's ability to perform normal ADLs. Evaluation: 5/10-8/10  2. Patient will demonstrate PROM right shoulder flex 0-160, scaption 0-140, IR 0-60, ER 0-80 to increase ease of ADLs. Evaluation:  PROM right shoulder flexion 0-85; scaption 0-65. 3. Patient will increase FOTO score to 62 to indicate increased functional mobility. Evaluation:  34  4. Patient will be able to reach to the bottom shelf of a kitchen cabinet in order to perform normal ADLs.   Evaluation:  NT, not within the protocol at this time.       PLAN  []  Upgrade activities as tolerated     [x]  Continue plan of care  []  Update interventions per flow sheet       []  Discharge due to:_  []  Other:_      Crispin Duran PTA 5/20/2021  8:50 AM    Future Appointments   Date Time Provider Woodrow Drake   5/26/2021 8:45 AM Kirby Dash, PT [facility]   5/28/2021  8:45 AM Kirby Dash, PT [facility]   6/9/2021  8:45 AM Dalia Noonan, PTA [facility]   6/11/2021  8:45 AM Kirby Dash, PT [facility]   6/14/2021  8:45 AM Kirby Dash, PT [facility]   6/18/2021 10:15 AM Dalia Noonan, PTA [facility]   6/19/2021  8:45 AM Dalia Noonan, PTA [facility]

## 2021-05-26 ENCOUNTER — OFFICE VISIT (OUTPATIENT)
Dept: ORTHOPEDIC SURGERY | Age: 46
End: 2021-05-26
Payer: OTHER GOVERNMENT

## 2021-05-26 ENCOUNTER — HOSPITAL ENCOUNTER (OUTPATIENT)
Dept: PHYSICAL THERAPY | Age: 46
Discharge: HOME | End: 2021-05-26
Attending: ORTHOPAEDIC SURGERY
Payer: OTHER GOVERNMENT

## 2021-05-26 DIAGNOSIS — M75.111 NONTRAUMATIC INCOMPLETE TEAR OF RIGHT ROTATOR CUFF: ICD-10-CM

## 2021-05-26 DIAGNOSIS — G89.18 POST-OP PAIN: Primary | ICD-10-CM

## 2021-05-26 DIAGNOSIS — Z98.890 STATUS POST ARTHROSCOPY OF RIGHT SHOULDER: ICD-10-CM

## 2021-05-26 PROCEDURE — 97110 THERAPEUTIC EXERCISES: CPT | Performed by: PHYSICAL THERAPIST

## 2021-05-26 PROCEDURE — 97140 MANUAL THERAPY 1/> REGIONS: CPT | Performed by: PHYSICAL THERAPIST

## 2021-05-26 PROCEDURE — 99024 POSTOP FOLLOW-UP VISIT: CPT | Performed by: ORTHOPAEDIC SURGERY

## 2021-05-26 RX ORDER — DICLOFENAC SODIUM 10 MG/G
4 GEL TOPICAL 4 TIMES DAILY
Qty: 100 G | Refills: 3 | Status: SHIPPED | OUTPATIENT
Start: 2021-05-26

## 2021-05-26 RX ORDER — OXYCODONE AND ACETAMINOPHEN 5; 325 MG/1; MG/1
1 TABLET ORAL
Qty: 21 TABLET | Refills: 0 | Status: SHIPPED | OUTPATIENT
Start: 2021-05-26 | End: 2021-06-02

## 2021-05-26 RX ORDER — DICLOFENAC SODIUM 50 MG/1
50 TABLET, DELAYED RELEASE ORAL 2 TIMES DAILY WITH MEALS
Qty: 60 TABLET | Refills: 3 | Status: SHIPPED | OUTPATIENT
Start: 2021-05-26 | End: 2021-07-28

## 2021-05-26 NOTE — PROGRESS NOTES
PT DAILY TREATMENT NOTE 11    Patient Name: Dorota  Date:2021  : 1975  [x]  Patient  Verified  Payor: [facility] / Plan: [facility] CCN / Product Type: Federal Funded Programs /    In time:8:50  Out time:9:35  Total Treatment Time (min): 45  Visit #: 3 of 12    Treatment Area: Pain in right shoulder [M25.511]    SUBJECTIVE  Pain Level (0-10 scale): 0  Any medication changes, allergies to medications, adverse drug reactions, diagnosis change, or new procedure performed?: [x] No    [] Yes (see summary sheet for update)  Subjective functional status/changes:   [] No changes reported  Patient reports she is having no pain today. OBJECTIVE    Modality rationale: decrease inflammation and decrease pain to improve the patients ability to increase tolerance to activity.    Min Type Additional Details    [] Estim:  []Unatt       []IFC  []Premod                        []Other:  []w/ice   []w/heat  Position:  Location:    [] Estim: []Att    []TENS instruct  []NMES                    []Other:  []w/US   []w/ice   []w/heat  Position:  Location:    []  Traction: [] Cervical       []Lumbar                       [] Prone          []Supine                       []Intermittent   []Continuous Lbs:  [] before manual  [] after manual    []  Ultrasound: []Continuous   [] Pulsed                           []1MHz   []3MHz W/cm2:  Location:    []  Iontophoresis with dexamethasone         Location: [] Take home patch   [] In clinic   10 []  Ice     []  heat  []  Ice massage  []  Laser   []  Anodyne Position:sitting  Location:right shoulder    []  Laser with stim  []  Other:  Position:  Location:    []  Vasopneumatic Device Pressure:       [] lo [] med [] hi   Temperature: [] lo [] med [] hi   [] Skin assessment post-treatment:  []intact []redness- no adverse reaction    []redness - adverse reaction:     23 min Therapeutic Exercise:  [] See flow sheet :Emphasis on increasing shoulder PROM and wrist/hand/forearm strength. Rationale: increase ROM and increase strength to improve the patients ability to increase ease with functional ADLs. 12 min Manual Therapy:  PROM right shoulder to increase elevation; grade 1 distraction. The manual therapy interventions were performed at a separate and distinct time from the therapeutic activities interventions. Rationale: decrease pain, increase ROM and increase tissue extensibility to increase ease of motion to improve function. With   [] TE   [] TA   [] neuro   [] other: Patient Education: [x] Review HEP    [] Progressed/Changed HEP based on:   [] positioning   [] body mechanics   [] transfers   [] heat/ice application    [] other:      Other Objective/Functional Measures: PROM right shoulder flexion 0-115; scaption 0-90. Pain Level (0-10 scale) post treatment: 3    ASSESSMENT/Changes in Function: Patient with improved PROM right shoulder. Patient will continue to benefit from skilled PT services to modify and progress therapeutic interventions, address functional mobility deficits, address ROM deficits, address strength deficits and analyze and address soft tissue restrictions to attain remaining goals. [x]  See Plan of Care  []  See progress note/recertification  []  See Discharge Summary         Progress towards goals / Updated goals:  Short Term Goals: To be accomplished in 1 weeks:  1.  Patient will become proficient in their HEP and will be compliant in performing that program.  Evaluation:   Patient given a written/illustrated HEP. Current: Progressing: Patient has initiated HEP. 5/20/21   2.  Patient will demonstrate PROM right shoulder flex 0-120; scaption 0-100 degrees. Evaluation:  PROM right shoulder flexion 0-85; scaption 0-65 degrees. Current:  PROM right shoulder flexion 0-115; scaption 0-90.  5/26/2021. Goal Met.     Long Term Goals: To be accomplished in 4 weeks:  1.  Patient's pain level will be 2-3/10 with activity in order to improve patient's ability to perform normal ADLs. Evaluation: 5/10-8/10  Current:  0/10-8/10.  5/26/2021. Progressing. 2. Patient will demonstrate PROM right shoulder flex 0-160, scaption 0-140, IR 0-60, ER 0-80 to increase ease of ADLs. Evaluation:  PROM right shoulder flexion 0-85; scaption 0-65. PROM right shoulder flexion 0-115; scaption 0-90.  5/26/2021. Progressing  3. Patient will increase FOTO score to 62 to indicate increased functional mobility. Evaluation:  34  4. Patient will be able to reach to the bottom shelf of a kitchen cabinet in order to perform normal ADLs. Evaluation:  NT, not within the protocol at this time.     PLAN  [x]  Upgrade activities as tolerated     [x]  Continue plan of care  []  Update interventions per flow sheet       []  Discharge due to:_  []  Other:_      Ever Calloway, PT 5/26/2021  8:51 AM    Future Appointments   Date Time Provider Woodrow Drake   5/28/2021  8:45 AM Donna Tena, PT [facility]   6/9/2021  8:45 AM Lamar Obando, PTA [facility]   6/11/2021  8:45 AM Donna Colace, PT [facility]   6/14/2021  8:45 AM Donna Tena, PT [facility]   6/18/2021 10:15 AM Lamar Obando, PTA [facility]   6/19/2021  8:45 AM Lamar Obando, PTA [facility]

## 2021-05-26 NOTE — PROGRESS NOTES
Ayush Fish  1975     HISTORY OF PRESENT ILLNESS  Ayush Fish is a 48 y.o. female who presents today for evaluation s/p Right shoulder arthroscopic rotator cuff repair and biceps tenodesis on 4/22/21. Patient has not been going to PT. Describes pain as a 6/10. Has been taking norco for pain. Still has night pain. She has been compliant with her exercises and restrictions. She reports wearing her sling at times when the shoulder starts hurting. She comes to the office in the sling today. She reports PT helping with her motion and pain but afterwards she is pretty sore. Patient denies any fever, chills, chest pain, shortness of breath or calf pain. There are no new illness or injuries to report since last seen in the office. PHYSICAL EXAM:   There were no vitals taken for this visit. The patient is a well-developed, well-nourished female in no acute distress. The patient is alert and oriented times three. The patient appears to be well groomed. Mood and affect are normal.  ORTHOPEDIC EXAM of right shoulder:  Inspection: swelling not present,  Bruising not present  Incision well healed  Passive glenohumeral abduction 0-80 degrees, 90 PFF, 20 PER  Stability: Stable  Strength: n/a  2+ distal pulses    IMPRESSION:  s/p Right shoulder arthroscopic rotator cuff repair and biceps tenodesis    PLAN:   Pt having pain post operatively  She is stiff on exam today. She has been in PT and working on her exercises but wearing her sling at times. I spoke with her about not wearing the sling that it will only stiffen her shoulder more. Another order for PT was placed today. She was also given a prescription for diclofenac and volteran gel to help with discomfort. Stressed to patient that nothing causes an increase in pain. Pt given a refill of pain medication today. ONE TIME REFILL Percocet 5 mg Patient given pain medication for short term acute pain relief.  Goal is to treat patient according to above plan and to ultimately have patient off all pain medications once appropriate. If chronic pain management is required beyond what is expected for current orthopedic problem, will refer patient to pain management.   was reviewed and will be reviewed with every medication refill request.   RTC 4 weeks      Indu Pi, [phone]

## 2021-05-28 ENCOUNTER — HOSPITAL ENCOUNTER (OUTPATIENT)
Dept: PHYSICAL THERAPY | Age: 46
Discharge: HOME | End: 2021-05-28
Attending: ORTHOPAEDIC SURGERY
Payer: OTHER GOVERNMENT

## 2021-05-28 PROCEDURE — 97110 THERAPEUTIC EXERCISES: CPT | Performed by: PHYSICAL THERAPIST

## 2021-05-28 PROCEDURE — 97140 MANUAL THERAPY 1/> REGIONS: CPT | Performed by: PHYSICAL THERAPIST

## 2021-05-28 NOTE — PROGRESS NOTES
PT DAILY TREATMENT NOTE 11    Patient Name: Dorota  Date:2021  : 1975  [x]  Patient  Verified  [facility] / Plan: [facility] CCN / Product Type: Federal Funded Programs /    In time:8:47  Out time:9:30  Total Treatment Time (min): 43  Visit #: 4 of 12    Treatment Area: Pain in right shoulder [M25.511]    SUBJECTIVE  Pain Level (0-10 scale): 5  Any medication changes, allergies to medications, adverse drug reactions, diagnosis change, or new procedure performed?: [x] No    [] Yes (see summary sheet for update)  Subjective functional status/changes:   [] No changes reported  Patient with c/o increased soreness today. She was told to discontinue her sling on her last visit with ortho. States she is a little more sore not wearing the sling. OBJECTIVE    Modality rationale: decrease inflammation and decrease pain to improve the patients ability to increase tolerance to activity.    Min Type Additional Details    [] Estim:  []Unatt       []IFC  []Premod                        []Other:  []w/ice   []w/heat  Position:  Location:    [] Estim: []Att    []TENS instruct  []NMES                    []Other:  []w/US   []w/ice   []w/heat  Position:  Location:    []  Traction: [] Cervical       []Lumbar                       [] Prone          []Supine                       []Intermittent   []Continuous Lbs:  [] before manual  [] after manual    []  Ultrasound: []Continuous   [] Pulsed                           []1MHz   []3MHz W/cm2:  Location:    []  Iontophoresis with dexamethasone         Location: [] Take home patch   [] In clinic   10 [x]  Ice     []  heat  []  Ice massage  []  Laser   []  Anodyne Position: sitting  Location:right shoulder    []  Laser with stim  []  Other:  Position:  Location:    []  Vasopneumatic Device Pressure:       [] lo [] med [] hi   Temperature: [] lo [] med [] hi   [] Skin assessment post-treatment:  []intact []redness- no adverse reaction    []redness - adverse reaction:     23 min Therapeutic Exercise:  [] See flow sheet [de-identified]Emphasis on increasing shoulder PROM and wrist/hand/forearm strength. Rationale: increase ROM and increase strength to improve the patients ability to increase ease with functional ADLs. 10 min Manual Therapy:  PROM right shoulder to increase elevation; grade 1 distraction. The manual therapy interventions were performed at a separate and distinct time from the therapeutic activities interventions. Rationale: decrease pain, increase ROM and increase tissue extensibility to increase ease with motion to increase function. ationale: With   [] TE   [] TA   [] neuro   [] other: Patient Education: [x] Review HEP    [] Progressed/Changed HEP based on:   [] positioning   [] body mechanics   [] transfers   [] heat/ice application    [] other:      Other Objective/Functional Measures: Passive ROM WFL for elevation. Pain Level (0-10 scale) post treatment: 6    ASSESSMENT/Changes in Function: Patient with improving mobility in the right shoulder. She has increased discomfort now that she is out of the sling. Advised she wean out rather than go cold turkey. Patient will continue to benefit from skilled PT services to modify and progress therapeutic interventions, address functional mobility deficits, address ROM deficits, address strength deficits and analyze and address soft tissue restrictions to attain remaining goals. [x]  See Plan of Care  []  See progress note/recertification  []  See Discharge Summary         Progress towards goals / Updated goals:  Short Term Goals: To be accomplished in 1 weeks:  1.  Patient will become proficient in their HEP and will be compliant in performing that program.  Evaluation:   Patient given a written/illustrated HEP.   Current: Progressing: Patient has initiated HEP. 5/20/21   2.  Patient will demonstrate PROM right shoulder flex 0-120; scaption 0-100 degrees. Evaluation:  PROM right shoulder flexion 0-85; scaption 0-65 degrees. Current:  PROM right shoulder flexion 0-115; scaption 0-90.  5/26/2021. Goal Met.     Long Term Goals: To be accomplished in 4 weeks:  1. Patient's pain level will be 2-3/10 with activity in order to improve patient's ability to perform normal ADLs. Evaluation: 5/10-8/10  Current:  0/10-8/10.  5/26/2021. Progressing. 2. Patient will demonstrate PROM right shoulder flex 0-160, scaption 0-140, IR 0-60, ER 0-80 to increase ease of ADLs. Evaluation:  PROM right shoulder flexion 0-85; scaption 0-65. PROM right shoulder flexion 0-115; scaption 0-90.  5/26/2021. Progressing  3. Patient will increase FOTO score to 62 to indicate increased functional mobility. Evaluation:  34  4. Patient will be able to reach to the bottom shelf of a kitchen cabinet in order to perform normal ADLs. Evaluation:  NT, not within the protocol at this time.     PLAN  []  Upgrade activities as tolerated     []  Continue plan of care  []  Update interventions per flow sheet       []  Discharge due to:_  []  Other:_      Ben Booth, ADRIA 5/28/2021  9:19 AM    Future Appointments   Date Time Provider Woodrow Draek   6/9/2021  8:45 AM [place] Bobby, PTA [facility]   6/11/2021  8:45 AM Linda Medico, PT [facility]   6/14/2021  8:45 AM Linda Medico, PT [facility]   6/18/2021 10:15 AM [place] Bobby, PTA [facility]   6/19/2021  8:45 AM [place] Bobby, PTA [facility]   6/23/2021  8:30 AM JAMES Gupta BS AMB

## 2021-06-11 ENCOUNTER — HOSPITAL ENCOUNTER (OUTPATIENT)
Dept: PHYSICAL THERAPY | Age: 46
Discharge: HOME | End: 2021-06-11
Attending: ORTHOPAEDIC SURGERY
Payer: OTHER GOVERNMENT

## 2021-06-11 PROCEDURE — 97110 THERAPEUTIC EXERCISES: CPT

## 2021-06-11 PROCEDURE — 97112 NEUROMUSCULAR REEDUCATION: CPT

## 2021-06-11 NOTE — PROGRESS NOTES
PT DAILY TREATMENT NOTE     Patient Name: Darin Valiente  Date:2021  : 1975  [x]  Patient  Verified  Payor: Carrie Manzo / Plan: [id number] / Product Type: Federal Funded Programs /    In time:8:51  Out time:9:38  Total Treatment Time (min): 48  Visit #: 5 of 12      Treatment Area: Pain in right shoulder [M25.511]    SUBJECTIVE  Pain Level (0-10 scale): 0  Any medication changes, allergies to medications, adverse drug reactions, diagnosis change, or new procedure performed?: [x] No    [] Yes (see summary sheet for update)  Subjective functional status/changes:   [] No changes reported  Patient reports that on her vacation to [place] she was having some pain in her shoulder. Patient states her shoulder feels heavy and that it is hanging. OBJECTIVE              Modality rationale: decrease pain to improve the patients ability to decrease difficulty while performing tasks. Min Type Additional Details    10 [x]? Ice     []?  heat  []? Ice massage  []? Laser   []? Anodyne Position:sitting  Location:shoulder    []? Skin assessment post-treatment:  []?intact []? redness- no adverse reaction    []? redness - adverse reaction:         28 min Therapeutic Exercise:  [x]? See flow sheet :   Rationale: increase ROM and increase strength to improve the patients ability to increase ease with ADLS.             10 min Manual Therapy:    Supine-elbow PROM flexion/extension/pronoation/supination  Supine- shoulder PROM per protocol. Supine- right shoulder GHJ AP and inferior grade I-II       The manual therapy interventions were performed at a separate and distinct time from the therapeutic activities interventions. Rationale: decrease pain, increase ROM, increase tissue extensibility and decrease trigger points to increase ease with dressing.              With   [] TE   [] TA   [] neuro   [] other: Patient Education: [x] Review HEP    [] Progressed/Changed HEP based on:   [] positioning   [] body mechanics   [] transfers   [] heat/ice application    [] other:      Other Objective/Functional Measures:  PROM right shoulder flexion 0-123; scaption 0-95. Pain Level (0-10 scale) post treatment: 4    ASSESSMENT/Changes in Function: Initiated light AAROM exercises per protocol. Patient not able to tolerate supine wand in scaption. Reviewed with patient that per protocol she should not be performing AROM of right shoulder. Provided patient updated HEP with AAROM and isometrics. Patient will continue to benefit from skilled PT services to modify and progress therapeutic interventions, address functional mobility deficits, address ROM deficits, address strength deficits and analyze and address soft tissue restrictions to attain remaining goals. []  See Plan of Care  []  See progress note/recertification  []  See Discharge Summary         Progress towards goals / Updated goals:  Short Term Goals: To be accomplished in 1 weeks:  1.  Patient will become proficient in their HEP and will be compliant in performing that program.  Evaluation:   Patient given a written/illustrated HEP. Current: Progressing: Patient has initiated HEP. 5/20/21   2.  Patient will demonstrate PROM right shoulder flex 0-120; scaption 0-100 degrees. Evaluation:  PROM right shoulder flexion 0-85; scaption 0-65 degrees. Current:  PROM right shoulder flexion 0-115; scaption 0-90.  5/26/2021.  Goal Met.     Long Term Goals: To be accomplished in 4 weeks:  1. Patient's pain level will be 2-3/10 with activity in order to improve patient's ability to perform normal ADLs. Evaluation: 5/10-8/10  Current:  0/10-8/10.  5/26/2021.  Progressing. 2. Patient will demonstrate PROM right shoulder flex 0-160, scaption 0-140, IR 0-60, ER 0-80 to increase ease of ADLs. Evaluation:  PROM right shoulder flexion 0-85; scaption 0-65. Current; progressing: PROM right shoulder flexion 0-123; scaption 0-95.  6/11/21  3.  Patient will increase FOTO score to 62 to indicate increased functional mobility. Evaluation:  34  4. Patient will be able to reach to the bottom shelf of a kitchen cabinet in order to perform normal ADLs.   Evaluation:  NT, not within the protocol at this time.       PLAN  []  Upgrade activities as tolerated     [x]  Continue plan of care  []  Update interventions per flow sheet       []  Discharge due to:_  []  Other:_      Negrito Roberson PTA 6/11/2021  8:54 AM    Future Appointments   Date Time Provider Woodrow Drake   6/14/2021  8:45 AM Jayce Moore, GINGER [facility]   6/18/2021 10:15 AM Jayce Moore, GINGER [facility]   6/19/2021  8:45 AM Jayce Moore, PTA [facility]   6/23/2021  8:30 AM JAMES Ling BS AMB

## 2021-06-14 ENCOUNTER — HOSPITAL ENCOUNTER (OUTPATIENT)
Dept: PHYSICAL THERAPY | Age: 46
Discharge: HOME | End: 2021-06-14
Attending: ORTHOPAEDIC SURGERY
Payer: OTHER GOVERNMENT

## 2021-06-14 PROCEDURE — 97140 MANUAL THERAPY 1/> REGIONS: CPT

## 2021-06-14 PROCEDURE — 97110 THERAPEUTIC EXERCISES: CPT

## 2021-06-14 NOTE — PROGRESS NOTES
PT DAILY TREATMENT NOTE     Patient Name: Leah Law  Date:2021  : 1975  [x]  Patient  Verified  Payor: [facility] CCN / Plan: [facility] CCN / Product Type: Federal Funded Programs /    In time:8:49  Out time:9:25  Total Treatment Time (min): 36  Visit #: 6 of 12      Treatment Area: Pain in right shoulder [M25.511]    SUBJECTIVE  Pain Level (0-10 scale): 2  Any medication changes, allergies to medications, adverse drug reactions, diagnosis change, or new procedure performed?: [x] No    [] Yes (see summary sheet for update)  Subjective functional status/changes:   [] No changes reported  Patient report she was sore after the new exercises added during last session. OBJECTIVE            26 min Therapeutic Exercise:  [x]? ? See flow sheet :   Rationale: increase ROM and increase strength to improve the patients ability to increase ease with ADLS.                10 min Manual Therapy:    Supine-elbow PROM flexion/extension/pronoation/supination  Supine- shoulder PROM per protocol. Supine- right shoulder GHJ AP and inferior grade I-II       The manual therapy interventions were performed at a separate and distinct time from the therapeutic activities interventions. Rationale: decrease pain, increase ROM, increase tissue extensibility and decrease trigger points to increase ease with dressing.                                                                           With   [] TE   [] TA   [] neuro   [] other: Patient Education: [x] Review HEP    [] Progressed/Changed HEP based on:   [] positioning   [] body mechanics   [] transfers   [] heat/ice application    [] other:      Other Objective/Functional Measures: pain range 0/10-4/10. Pain Level (0-10 scale) post treatment: 4    ASSESSMENT/Changes in Function: Patent has empty end feel with manual with pain.      Patient will continue to benefit from skilled PT services to modify and progress therapeutic interventions, address functional mobility deficits, address ROM deficits, address strength deficits and analyze and address soft tissue restrictions to attain remaining goals. []  See Plan of Care  []  See progress note/recertification  []  See Discharge Summary         Progress towards goals / Updated goals:  Short Term Goals: To be accomplished in 1 weeks:  1.  Patient will become proficient in their HEP and will be compliant in performing that program.  Evaluation:   Patient given a written/illustrated HEP. Current: Progressing: Patient has initiated HEP. 5/20/21   2.  Patient will demonstrate PROM right shoulder flex 0-120; scaption 0-100 degrees. Evaluation:  PROM right shoulder flexion 0-85; scaption 0-65 degrees. Current:  Goal Met PROM right shoulder flexion 0-115; scaption 0-90.  5/26/2021. .     Long Term Goals: To be accomplished in 4 weeks:  1. Patient's pain level will be 2-3/10 with activity in order to improve patient's ability to perform normal ADLs. Evaluation: 5/10-8/10  Current:Progressing.  pain range 0/10-4/10.  6/14/2021.    2. Patient will demonstrate PROM right shoulder flex 0-160, scaption 0-140, IR 0-60, ER 0-80 to increase ease of ADLs. Evaluation:  PROM right shoulder flexion 0-85; scaption 0-65. Current; progressing: PROM right shoulder flexion 0-123; scaption 0-95.  6/11/21  3. Patient will increase FOTO score to 62 to indicate increased functional mobility. Evaluation:  34  4. Patient will be able to reach to the bottom shelf of a kitchen cabinet in order to perform normal ADLs. Evaluation:  NT, not within the protocol at this time. Current NT due to protocol.  6/14/21       PLAN  []  Upgrade activities as tolerated     [x]  Continue plan of care  []  Update interventions per flow sheet       []  Discharge due to:_  []  Other:_      Cinda Strickland PTA 6/14/2021  8:51 AM    Future Appointments   Date Time Provider Woodrow Drake   6/18/2021 10:15 AM Hector Bellamy PTA [facility] 6/19/2021  8:45 AM Jemima Elizabeth, PTA [facility]   6/23/2021  8:30 AM JAMES Jung BS AMB

## 2021-06-18 ENCOUNTER — APPOINTMENT (OUTPATIENT)
Dept: PHYSICAL THERAPY | Age: 46
End: 2021-06-18
Attending: ORTHOPAEDIC SURGERY
Payer: OTHER GOVERNMENT

## 2021-06-19 ENCOUNTER — APPOINTMENT (OUTPATIENT)
Dept: PHYSICAL THERAPY | Age: 46
End: 2021-06-19
Attending: ORTHOPAEDIC SURGERY
Payer: OTHER GOVERNMENT

## 2021-06-21 ENCOUNTER — APPOINTMENT (OUTPATIENT)
Dept: PHYSICAL THERAPY | Age: 46
End: 2021-06-21
Attending: ORTHOPAEDIC SURGERY
Payer: OTHER GOVERNMENT

## 2021-06-22 NOTE — PROGRESS NOTES
Darin Valiente  1975     HISTORY OF PRESENT ILLNESS  Darin Valiente is a 48 y.o. female who presents today for evaluation s/p Right shoulder arthroscopic rotator cuff repair and biceps tenodesis on 4/22/21. Patient has been going to PT. Describes pain as a 1/10. Has been taking OTC pain medication for pain. She has been compliant with her exercises and restrictions. She is working in [place] and making progress. She reports her arm feels heavy at this point. When she works on her motion she feels a tightness and soreness at times. She is otherwise doing well. Patient denies any fever, chills, chest pain, shortness of breath or calf pain. There are no new illness or injuries to report since last seen in the office. PHYSICAL EXAM:   Visit Vitals  Pulse 74   Temp 97.1 °F (36.2 °C) (Temporal)   Wt 184 lb (83.5 kg)   SpO2 98%   BMI 28.82 kg/m²      The patient is a well-developed, well-nourished female in no acute distress. The patient is alert and oriented times three. The patient appears to be well groomed. Mood and affect are normal.  ORTHOPEDIC EXAM of right shoulder:  Inspection: swelling not present,  Bruising not present  Incision well healed  Passive glenohumeral abduction 0-80 degrees, 170 PFF, 30 PER  Stability: Stable  Strength: n/a  2+ distal pulses    IMPRESSION:  s/p Right shoulder arthroscopic rotator cuff repair and biceps tenodesis    PLAN:   Pt doing well post operatively  She is stiff on exam today but has made improvements since her last visit. She is out of the sling all together which is good. Another order for PT was placed today. Stressed to patient that nothing causes an increase in pain. Pt not given a refill of pain medication today.    RTC 4 weeks      [facility]

## 2021-06-23 ENCOUNTER — OFFICE VISIT (OUTPATIENT)
Dept: ORTHOPEDIC SURGERY | Age: 46
End: 2021-06-23
Payer: COMMERCIAL

## 2021-06-23 VITALS — WEIGHT: 184 LBS | OXYGEN SATURATION: 98 % | BODY MASS INDEX: 28.82 KG/M2 | TEMPERATURE: 97.1 F | HEART RATE: 74 BPM

## 2021-06-23 DIAGNOSIS — Z98.890 STATUS POST ARTHROSCOPY OF RIGHT SHOULDER: ICD-10-CM

## 2021-06-23 DIAGNOSIS — M75.111 NONTRAUMATIC INCOMPLETE TEAR OF RIGHT ROTATOR CUFF: Primary | ICD-10-CM

## 2021-06-23 PROCEDURE — 99024 POSTOP FOLLOW-UP VISIT: CPT | Performed by: ORTHOPAEDIC SURGERY

## 2021-06-25 ENCOUNTER — APPOINTMENT (OUTPATIENT)
Dept: PHYSICAL THERAPY | Age: 46
End: 2021-06-25
Attending: ORTHOPAEDIC SURGERY
Payer: OTHER GOVERNMENT

## 2021-06-29 ENCOUNTER — HOSPITAL ENCOUNTER (OUTPATIENT)
Dept: PHYSICAL THERAPY | Age: 46
Discharge: HOME | End: 2021-06-29
Attending: ORTHOPAEDIC SURGERY
Payer: OTHER GOVERNMENT

## 2021-06-29 PROCEDURE — 97110 THERAPEUTIC EXERCISES: CPT

## 2021-06-29 PROCEDURE — 97140 MANUAL THERAPY 1/> REGIONS: CPT

## 2021-06-29 NOTE — PROGRESS NOTES
[facility]              [address]        [place], [address] [place]   [phone] fax    Progress Note  Patient name: Remi Jackman Start of Care: 2021   Referral source: Snehal Pardo [place] : 1975   Medical/Treatment Diagnosis: Pain in right shoulder [M25.511]  Payor: [facility] CCN / Plan: [employer] / Product Type: [employer] Programs /  Onset Date:DoS 2021     Prior Hospitalization: see medical history Provider#: [id number]   Medications: Verified on Patient Summary List     Comorbidities: Allergies, Anxiety or Panic Disorders, Back pain, Depression, Headaches, Prior  Surgery, Prosthesis / Implants, Sleep dysfunction   Prior Level of Function: She had limited ROM and painful right shoulder prior to surgery. Independent self care. Visits from Start of Care: 8    Missed Visits: 2    Established Goals:   Short Term Goals: To be accomplished in 1 weeks:  1. Patient will become proficient in their HEP and will be compliant in performing that program.  Evaluation:   Patient given a written/illustrated HEP. Current: MET: pt reports performing HEP. 2.  Patient will demonstrate PROM right shoulder flex 0-120; scaption 0-100 degrees. Evaluation:  PROM right shoulder flexion 0-85; scaption 0-65 degrees. Current:  Goal Met PROM right shoulder flexion 0-115; scaption 0-90. . .     Long Term Goals: To be accomplished in 4 weeks:  1. Patient's pain level will be 2-3/10 with activity in order to improve patient's ability to perform normal ADLs. Evaluation: 5/10-8/10  Current:MET;.  pain range 0/10-2/10. .    2. Patient will demonstrate PROM right shoulder flex 0-160, scaption 0-140, IR 0-60, ER 0-80 to increase ease of ADLs. Evaluation:  PROM right shoulder flexion 0-85; scaption 0-65.   Current; progressing: PROM right shoulder flexion 0-140; scaption 0-100, ER (in 80 deg abduction) = 30 deg, IR (in 80 deg abduction) = 45 deg   3. Patient will increase FOTO score to 62 to indicate increased functional mobility. Evaluation:  34  Current: progressing: FOTO= 53.   4. Patient will be able to reach to the bottom shelf of a kitchen cabinet in order to perform normal ADLs. Evaluation:  NT, not within the protocol at this time. Current NT due to protocol. Key Functional Changes: see goals above. Updated Goals: continue with unmet goals. 1. Patient will demonstrate AROM right shoulder flex 0-160, scaption 0-140, IR 0-60, ER 0-80 to increase ease of ADLs. Evaluation:  PROM right shoulder flexion 0-85; scaption 0-65. PN:  PROM right shoulder flexion 0-140; scaption 0-100, ER (in 80 deg abduction) = 30 deg, IR (in 80 deg abduction) = 45 deg   2. Patient will increase FOTO score to 62 to indicate increased functional mobility. Evaluation:  34  PN: progressing: FOTO= 53.   3. Patient will be able to reach to the bottom shelf of a kitchen cabinet in order to perform normal ADLs. Evaluation:  NT, not within the protocol at this time. PN:  Notes, I can't do this. 4. Patient will, on MMT, score 4/5-5/5 for biceps and triceps strength in order to increase her functional activity level. PN: Not tested, patient just beginning light resistance per protocol. ASSESSMENT/RECOMMENDATIONS:  Patient is progressing well with mobility of right shoulder. Patient continues to be in AAROM and PROM phase of protocol with good progression with minimal pain level ratings. Patient will continue to benefit from skilled PT services to modify and progress therapeutic interventions, address functional mobility deficits, address ROM deficits, address strength deficits and analyze and address soft tissue restrictions to attain remaining goals.       [x]Continue therapy per initial plan/protocol at a frequency of  2 x per week for 4 weeks  []Continue therapy with the following recommended changes:_____________________ _____________________________________________________________________  []Discontinue therapy progressing towards or have reached established goals  []Discontinue therapy due to lack of appreciable progress towards goals  []Discontinue therapy due to lack of attendance or compliance  []Await Physician's recommendations/decisions regarding therapy  []Other:________________________________________________________________    Thank you for this referral.    uBrke Gomes, PTA 6/29/2021 8:49 AM  NOTE TO PHYSICIAN:  Sary Mahoney [id number]   [facility]X TO [facility]: [phone]  If you are unable to process this request in 24 hours please contact our office: [phone]    []  I have read the above report and request that my patient continue as recommended. []  I have read the above report and request that my patient continue therapy with the following changes/special instructions:________________________________________  []I have read the above report and request that my patient be discharged from therapy.     Physician's Signature:____________Date:_________TIME:________     JAMES Cuevas  ** Signature, Date and Time must be completed for valid certification **

## 2021-06-29 NOTE — PROGRESS NOTES
PT DAILY TREATMENT NOTE     Patient Name: Dorota  Date:2021  : 1975  [x]  Patient  Verified  Payor: [facility] / Plan: [facility] CCN / Product Type: Federal Funded Programs /    In time:8:00  Out time:8:52  Total Treatment Time (min): 52  Visit #: 7 of 12    Treatment Area: Pain in right shoulder [M25.511]    SUBJECTIVE  Pain Level (0-10 scale): 2  Any medication changes, allergies to medications, adverse drug reactions, diagnosis change, or new procedure performed?: [x] No    [] Yes (see summary sheet for update)  Subjective functional status/changes:   [] No changes reported  Patient reports that at her follow up appointment with her doctor, they were pleased with how her shoulder is progressing. OBJECTIVE                     Modality rationale: decrease pain to improve the patients ability to decrease difficulty while performing tasks.    Min Type Additional Details     10 [x]? ?  Ice     []? ?  heat  []? ?  Ice massage  []? ?  Laser   []? ?  Anodyne Position:sitting  Location:shoulder     []? ? Skin assessment post-treatment:  []??intact []? ?redness- no adverse reaction    []? ?redness - adverse reaction:            27 min Therapeutic Exercise:  [x]? ?? See flow sheet :   Rationale: increase ROM and increase strength to improve the patients ability to increase ease with ADLS.                15 min Manual Therapy:    Supine-elbow PROM flexion/extension/pronoation/supination  Supine- shoulder PROM per protocol. Supine- right shoulder GHJ AP and inferior grade I-II       The manual therapy interventions were performed at a separate and distinct time from the therapeutic activities interventions.   Rationale: decrease pain, increase ROM, increase tissue extensibility and decrease trigger points to increase ease with dressing.            With   [] TE   [] TA   [] neuro   [] other: Patient Education: [x] Review HEP    [] Progressed/Changed HEP based on:   [] positioning [] body mechanics   [] transfers   [] heat/ice application    [] other:      Other Objective/Functional Measures: see goals    AAROM with pulleys flexion = 125, scaption = 130 deg. Pain Level (0-10 scale) post treatment: 3    ASSESSMENT/Changes in Function: Patient is progressing well with mobility of right shoulder. Patient continues to be in AAROM and PROM phase of protocol with good progression with minimal pain level ratings. Patient will continue to benefit from skilled PT services to modify and progress therapeutic interventions, address functional mobility deficits, address ROM deficits, address strength deficits and analyze and address soft tissue restrictions to attain remaining goals. []  See Plan of Care  [x]  See progress note/recertification  []  See Discharge Summary         Progress towards goals / Updated goals:  Short Term Goals: To be accomplished in 1 weeks:  1.  Patient will become proficient in their HEP and will be compliant in performing that program.  Evaluation:   Patient given a written/illustrated HEP. Current: MET: pt reports performing HEP. 6/29/21   2.  Patient will demonstrate PROM right shoulder flex 0-120; scaption 0-100 degrees. Evaluation:  PROM right shoulder flexion 0-85; scaption 0-65 degrees. Current:  Goal Met PROM right shoulder flexion 0-115; scaption 0-90.  5/26/2021. .     Long Term Goals: To be accomplished in 4 weeks:  1. Patient's pain level will be 2-3/10 with activity in order to improve patient's ability to perform normal ADLs. Evaluation: 5/10-8/10  Current:MET;.  pain range 0/10-2/10.  6/29/2021.    2. Patient will demonstrate PROM right shoulder flex 0-160, scaption 0-140, IR 0-60, ER 0-80 to increase ease of ADLs. Evaluation:  PROM right shoulder flexion 0-85; scaption 0-65. Current; progressing: PROM right shoulder flexion 0-140; scaption 0-100, ER (in 80 deg abduction) = 30 deg, IR (in 80 deg abduction) = 45 deg  6/29/21  3.  Patient will increase FOTO score to 62 to indicate increased functional mobility. Evaluation:  34  Current: progressing: FOTO= 53. 6/29/21  4. Patient will be able to reach to the bottom shelf of a kitchen cabinet in order to perform normal ADLs. Evaluation:  NT, not within the protocol at this time. Current NT due to protocol.  6/14/21            PLAN  []  Upgrade activities as tolerated     [x]  Continue plan of care  []  Update interventions per flow sheet       []  Discharge due to:_  []  Other:_      Joselo Krishnamurthy PTA 6/29/2021  8:08 AM    Future Appointments   Date Time Provider Woodrow Drake   7/2/2021 10:15 AM Annia Boland, PTA [facility]   7/6/2021  8:45 AM Annia Boland, PTA [facility]   7/9/2021 10:15 AM Mara Lawler, PT [facility]   7/12/2021  8:45 AM Annia Boland, PTA [facility]   7/16/2021  8:45 AM Annia Boland, PTA [facility]   7/21/2021  8:30 AM JAMES Garcia BS AMB

## 2021-07-02 ENCOUNTER — HOSPITAL ENCOUNTER (OUTPATIENT)
Dept: PHYSICAL THERAPY | Age: 46
Discharge: HOME | End: 2021-07-02
Attending: ORTHOPAEDIC SURGERY
Payer: OTHER GOVERNMENT

## 2021-07-02 PROCEDURE — 97110 THERAPEUTIC EXERCISES: CPT

## 2021-07-02 PROCEDURE — 97140 MANUAL THERAPY 1/> REGIONS: CPT

## 2021-07-02 NOTE — PROGRESS NOTES
PT DAILY TREATMENT NOTE     Patient Name: Juan Antonio Moody  Date:2021  : 1975  [x]  Patient  Verified  Payor: /    In time:10:14  Out time:11:05  Total Treatment Time (min): 51  Visit #: 1 of 8    Treatment Area: Pain in right shoulder [M25.511]    SUBJECTIVE  Pain Level (0-10 scale): 2  Any medication changes, allergies to medications, adverse drug reactions, diagnosis change, or new procedure performed?: [x] No    [] Yes (see summary sheet for update)  Subjective functional status/changes:   [] No changes reported  Patient reports she feels sore from the rainy weather. OBJECTIVE                 Modality rationale: decrease pain to improve the patients ability to decrease difficulty while performing tasks.    Min Type Additional Details     10 [x]? ??  Ice     []? ??  heat  []? ??  Ice massage  []? ??  Laser   []? ??  Anodyne Position:sitting  Location:shoulder     []??? Skin assessment post-treatment:  []???intact []? ??redness- no adverse reaction    []? ??redness - adverse reaction:            29 min Therapeutic Exercise:  [x]? ??? See flow sheet :   Rationale: increase ROM and increase strength to improve the patients ability to increase ease with ADLS.                12 min Manual Therapy:    Supine-elbow PROM flexion/extension/pronoation/supination  Supine- shoulder PROM per protocol. Supine- right shoulder GHJ AP and inferior grade I-II       The manual therapy interventions were performed at a separate and distinct time from the therapeutic activities interventions. Rationale: decrease pain, increase ROM, increase tissue extensibility and decrease trigger points to increase ease with dressing.            With   [] TE   [] TA   [] neuro   [] other: Patient Education: [x] Review HEP    [] Progressed/Changed HEP based on:   [] positioning   [] body mechanics   [] transfers   [] heat/ice application    [] other:      Other Objective/Functional Measures: PROM flexion scaption =~130 deg. Pain Level (0-10 scale) post treatment: 2    ASSESSMENT/Changes in Function: Continue with AAROM and PROM exercises per protocol. Pt plans to start AROM exercises at 12 weeks. Patient will continue to benefit from skilled PT services to modify and progress therapeutic interventions, address functional mobility deficits, address ROM deficits, address strength deficits, analyze and address soft tissue restrictions and analyze and cue movement patterns to attain remaining goals. []  See Plan of Care  []  See progress note/recertification  []  See Discharge Summary         Progress towards goals / Updated goals:  Short Term Goals: To be accomplished in 1 weeks:  1.  Patient will become proficient in their HEP and will be compliant in performing that program.  PN:  MET: pt reports performing HEP. 6/29/21   2.  Patient will demonstrate PROM right shoulder flex 0-120; scaption 0-100 degrees. PN:   Goal Met PROM right shoulder flexion 0-115; scaption 0-90.  5/26/2021. .     Long Term Goals: To be accomplished in 4 weeks:  1. Patient's pain level will be 2-3/10 with activity in order to improve patient's ability to perform normal ADLs. PN: MET; . Linnea [place] range 0/10-2/10.  6/29/2021.    2. Patient will demonstrate PROM right shoulder flex 0-160, scaption 0-140, IR 0-60, ER 0-80 to increase ease of ADLs. PN:  PROM right shoulder flexion 0-140; scaption 0-100, ER (in 80 deg abduction) = 30 deg, IR (in 80 deg abduction) = 45 deg  6/29/21  3. Patient will increase FOTO score to 62 to indicate increased functional mobility. PN:  FOTO= 53. 6/29/21  4. Patient will be able to reach to the bottom shelf of a kitchen cabinet in order to perform normal ADLs. PN: NT due to protocol.  6/14/21          PLAN  []  Upgrade activities as tolerated     [x]  Continue plan of care  []  Update interventions per flow sheet       []  Discharge due to:_  []  Other:_      Rebel Mason, GINGER 7/2/2021  10:16 AM    Future Appointments Date Time Provider Woodrow Drake   7/6/2021  8:45 AM Brook Cole, PTA [facility]   7/9/2021 10:15 AM Susan Arias, ADRIA [facility]   7/12/2021  8:45 AM Brook Cole, PTA [facility]   7/16/2021  8:45 AM Brook Cole, PTA [facility]   7/21/2021  8:30 AM JAMES Chappell BS AMB

## 2021-07-06 ENCOUNTER — HOSPITAL ENCOUNTER (OUTPATIENT)
Dept: PHYSICAL THERAPY | Age: 46
Discharge: HOME | End: 2021-07-06
Attending: ORTHOPAEDIC SURGERY
Payer: OTHER GOVERNMENT

## 2021-07-06 PROCEDURE — 97140 MANUAL THERAPY 1/> REGIONS: CPT

## 2021-07-06 PROCEDURE — 97110 THERAPEUTIC EXERCISES: CPT

## 2021-07-06 NOTE — PROGRESS NOTES
PT DAILY TREATMENT NOTE     Patient Name: Chip Padron  Date:2021  : 1975  [x]  Patient  Verified  Payor: [facility] / Plan: [facility] CCN / Product Type: Federal Funded Programs /    In time:8:46  Out time:9:45  Total Treatment Time (min): 61  Visit #: 2 of 8      Treatment Area: Pain in right shoulder [M25.511]    SUBJECTIVE  Pain Level (0-10 scale): 0  Any medication changes, allergies to medications, adverse drug reactions, diagnosis change, or new procedure performed?: [x] No    [] Yes (see summary sheet for update)  Subjective functional status/changes:   [] No changes reported  Patient reports no change in shoulder since last visit. OBJECTIVE                 Modality rationale: decrease pain to improve the patients ability to decrease difficulty while performing tasks.    Min Type Additional Details     10 [x]? ???  Ice     []????  heat  []????  Ice massage  []????  Laser   []????  Anodyne Position:sitting  Location:shoulder     []???? Skin assessment post-treatment:  []????intact []? ???redness- no adverse reaction    []? ???redness - adverse reaction:            38 min Therapeutic Exercise:  [x]? ???? See flow sheet :   Rationale: increase ROM and increase strength to improve the patients ability to increase ease with ADLS.                11 min Manual Therapy:    Supine-elbow PROM flexion/extension/pronoation/supination  Supine- shoulder PROM per protocol. Supine- right shoulder GHJ AP and inferior grade I-II       The manual therapy interventions were performed at a separate and distinct time from the therapeutic activities interventions.   Rationale: decrease pain, increase ROM, increase tissue extensibility and decrease trigger points to increase ease with dressing.                With   [] TE   [] TA   [] neuro   [] other: Patient Education: [x] Review HEP    [] Progressed/Changed HEP based on:   [] positioning   [] body mechanics   [] transfers   [] heat/ice application    [] other:      Other Objective/Functional Measures: PROM right shoulder flexion 0-135 deg, scaption 0-110 deg. Pain Level (0-10 scale) post treatment: 3    ASSESSMENT/Changes in Function: Had patient perform exercises with prolong hold at end range. Patient will continue to benefit from skilled PT services to modify and progress therapeutic interventions, address functional mobility deficits, address ROM deficits, address strength deficits and analyze and address soft tissue restrictions to attain remaining goals. []  See Plan of Care  []  See progress note/recertification  []  See Discharge Summary         Progress towards goals / Updated goals:  Short Term Goals: To be accomplished in 1 weeks:  1.  Patient will become proficient in their HEP and will be compliant in performing that program.  PN:  MET: pt reports performing HEP. 6/29/21   2.  Patient will demonstrate PROM right shoulder flex 0-120; scaption 0-100 degrees. PN:   Goal Met PROM right shoulder flexion 0-115; scaption 0-90.  5/26/2021. .     Long Term Goals: To be accomplished in 4 weeks:  1. Patient's pain level will be 2-3/10 with activity in order to improve patient's ability to perform normal ADLs. PN: MET; . Linnea [place] range 0/10-2/10.  6/29/2021.    2. Patient will demonstrate PROM right shoulder flex 0-160, scaption 0-140, IR 0-60, ER 0-80 to increase ease of ADLs. PN:  PROM right shoulder flexion 0-140; scaption 0-100, ER (in 80 deg abduction) = 30 deg, IR (in 80 deg abduction) = 45 deg  6/29/21  Current: PROM right shoulder flexion 0-135 deg, scaption 0-110 deg. 7/6/21  3. Patient will increase FOTO score to 62 to indicate increased functional mobility. PN:  FOTO= 53. 6/29/21  4. Patient will be able to reach to the bottom shelf of a kitchen cabinet in order to perform normal ADLs. PN: NT due to protocol.  6/14/21       PLAN  []  Upgrade activities as tolerated     [x]  Continue plan of care  []  Update interventions per flow sheet       []  Discharge due to:_  []  Other:_      Too Peña, PTA 7/6/2021  8:49 AM    Future Appointments   Date Time Provider Woodrow Drake   7/9/2021 10:15 AM Bryant Garcia, PT [facility]   7/12/2021  8:45 AM Falguni Clarke PTA [facility]   7/16/2021  8:45 AM Falguni Clarke PTA [facility]   7/21/2021  8:30 AM JAMES Zuniga BS AMB

## 2021-07-09 ENCOUNTER — HOSPITAL ENCOUNTER (OUTPATIENT)
Dept: PHYSICAL THERAPY | Age: 46
Discharge: HOME | End: 2021-07-09
Attending: ORTHOPAEDIC SURGERY
Payer: OTHER GOVERNMENT

## 2021-07-09 PROCEDURE — 97110 THERAPEUTIC EXERCISES: CPT

## 2021-07-09 PROCEDURE — 97140 MANUAL THERAPY 1/> REGIONS: CPT

## 2021-07-09 NOTE — PROGRESS NOTES
PT DAILY TREATMENT NOTE     Patient Name: Kyree Enriquez  Date:2021  : 1975  [x]  Patient  Verified  Payor: [facility] CCN / Plan: [facility] CCN / Product Type: Federal Funded Programs /    In Kain Ibrahim time:11:13  Total Treatment Time (min): 48  Visit #: 3 of 8      Treatment Area: Pain in right shoulder [M25.511]    SUBJECTIVE  Pain Level (0-10 scale): 1  Any medication changes, allergies to medications, adverse drug reactions, diagnosis change, or new procedure performed?: [x] No    [] Yes (see summary sheet for update)  Subjective functional status/changes:   [] No changes reported  Patient reports she continues not to reach with her right arm for objects. OBJECTIVE             Modality rationale: decrease pain to improve the patients ability to decrease difficulty while performing tasks.    Min Type Additional Details     10 [x]?????  Ice     []?????  heat  []?????  Ice massage  []?????  Laser   []?????  Anodyne Position:sitting  Location:shoulder     []????? Skin assessment post-treatment:  []?????intact []?????redness- no adverse reaction    []?????redness - adverse reaction:            27 min Therapeutic Exercise:  [x]?????? See flow sheet :   Rationale: increase ROM and increase strength to improve the patients ability to increase ease with ADLS.                11 min Manual Therapy:    Supine-elbow PROM flexion/extension/pronoation/supination  Supine- shoulder PROM per protocol. Supine- right shoulder GHJ AP and inferior grade I-II       The manual therapy interventions were performed at a separate and distinct time from the therapeutic activities interventions.   Rationale: decrease pain, increase ROM, increase tissue extensibility and decrease trigger points to increase ease with dressing.                With   [] TE   [] TA   [] neuro   [] other: Patient Education: [x] Review HEP    [] Progressed/Changed HEP based on:   [] positioning   [] body mechanics [] transfers   [] heat/ice application    [] other:      Other Objective/Functional Measures: AAROM flexion and scaption with finger ladder 130 deg     Pain Level (0-10 scale) post treatment: 2    ASSESSMENT/Changes in Function: Continue with PROM and AAROM exercises, with plans to initiate AROM next week. Patient will continue to benefit from skilled PT services to modify and progress therapeutic interventions, address functional mobility deficits, address ROM deficits, address strength deficits and analyze and address soft tissue restrictions to attain remaining goals. []  See Plan of Care  []  See progress note/recertification  []  See Discharge Summary         Progress towards goals / Updated goals:  Short Term Goals: To be accomplished in 1 weeks:  1.  Patient will become proficient in their HEP and will be compliant in performing that program.  PN:  MET: pt reports performing HEP. 6/29/21   2.  Patient will demonstrate PROM right shoulder flex 0-120; scaption 0-100 degrees. PN:   Goal Met PROM right shoulder flexion 0-115; scaption 0-90.  5/26/2021. .     Long Term Goals: To be accomplished in 4 weeks:  1. Patient's pain level will be 2-3/10 with activity in order to improve patient's ability to perform normal ADLs. PN: MET; . Haroldine Overlie range 0/10-2/10.  6/29/2021.    2. Patient will demonstrate PROM right shoulder flex 0-160, scaption 0-140, IR 0-60, ER 0-80 to increase ease of ADLs. PN:  PROM right shoulder flexion 0-140; scaption 0-100, ER (in 80 deg abduction) = 30 deg, IR (in 80 deg abduction) = 45 deg  6/29/21  Current: PROM right shoulder flexion 0-135 deg, scaption 0-110 deg. 7/6/21  3. Patient will increase FOTO score to 62 to indicate increased functional mobility. PN:  FOTO= 53. 6/29/21  4. Patient will be able to reach to the bottom shelf of a kitchen cabinet in order to perform normal ADLs. PN: NT due to protocol.  6/14/21       PLAN  []  Upgrade activities as tolerated     [x]  Continue plan of care  []  Update interventions per flow sheet       []  Discharge due to:_  []  Other:_      Vee Cabrera PTA 7/9/2021  10:31 AM    Future Appointments   Date Time Provider Woodrow Drake   7/12/2021  8:45 AM Ofelia Villarreal PTA [facility]   7/16/2021  8:45 AM Ofelia Villarreal PTA [facility]   7/21/2021  8:30 AM JAMES Arriaza BS AMB

## 2021-07-12 ENCOUNTER — HOSPITAL ENCOUNTER (OUTPATIENT)
Dept: PHYSICAL THERAPY | Age: 46
End: 2021-07-12
Attending: ORTHOPAEDIC SURGERY
Payer: OTHER GOVERNMENT

## 2021-07-14 ENCOUNTER — HOSPITAL ENCOUNTER (OUTPATIENT)
Dept: PHYSICAL THERAPY | Age: 46
Discharge: HOME | End: 2021-07-14
Attending: ORTHOPAEDIC SURGERY
Payer: OTHER GOVERNMENT

## 2021-07-14 PROCEDURE — 97112 NEUROMUSCULAR REEDUCATION: CPT

## 2021-07-14 PROCEDURE — 97110 THERAPEUTIC EXERCISES: CPT

## 2021-07-14 PROCEDURE — 97140 MANUAL THERAPY 1/> REGIONS: CPT

## 2021-07-14 NOTE — PROGRESS NOTES
PT DAILY TREATMENT NOTE     Patient Name: Angely Flaherty  Date:2021  : 1975  [x]  Patient  Verified  Payor: [facility] CCN / [facility] / Product Type: Federal Funded Programs /    In time:8:37  Out time:9:23  Total Treatment Time (min): 46  Visit #: 4 of 8      Treatment Area: Pain in right shoulder [M25.511]    SUBJECTIVE  Pain Level (0-10 scale): 3  Any medication changes, allergies to medications, adverse drug reactions, diagnosis change, or new procedure performed?: [x] No    [] Yes (see summary sheet for update)  Subjective functional status/changes:   [] No changes reported  Patient reports she slept on her shoulder last night causing it to be a little sore today. OBJECTIVE        23 min Therapeutic Exercise:  - See flow sheet :   Rationale: increase ROM, increase strength and improve coordination to improve the patients ability to increase ease with ADLs. 10 min Neuromuscular Re-education:  -  See flow sheet :empahsis on scapular stability. Rationale: increase ROM, increase strength, improve coordination, improve balance and increase proprioception  to improve the patients ability to increase ease with overhead reaching. 13 min Manual Therapy:    Supine-elbow PROM flexion/extension/pronoation/supination  Supine- shoulder PROM per protocol. Supine- right shoulder GHJ AP and inferior grade I-II    The manual therapy interventions were performed at a separate and distinct time from the therapeutic activities interventions. Rationale: decrease pain, increase ROM, increase tissue extensibility and decrease trigger points to increase ease with dressing.            With   [] TE   [] TA   [] neuro   [] other: Patient Education: [x] Review HEP    [] Progressed/Changed HEP based on:   [] positioning   [] body mechanics   [] transfers   [] heat/ice application    [] other:      Other Objective/Functional Measures: AROM right shoulder flexion 94 deg, abduction 80 deg. Pain Level (0-10 scale) post treatment: 4    ASSESSMENT/Changes in Function: Initiated light AROM exercises with good response. Patient continues to be with end range PROM in all planes with guarding. Patient will continue to benefit from skilled PT services to modify and progress therapeutic interventions, address functional mobility deficits, address ROM deficits, address strength deficits, analyze and address soft tissue restrictions and analyze and cue movement patterns to attain remaining goals. []  See Plan of Care  []  See progress note/recertification  []  See Discharge Summary         Progress towards goals / Updated goals:  Short Term Goals: To be accomplished in 1 weeks:  1.  Patient will become proficient in their HEP and will be compliant in performing that program.  PN:  MET: pt reports performing HEP. 6/29/21   2.  Patient will demonstrate PROM right shoulder flex 0-120; scaption 0-100 degrees. PN:   Goal Met PROM right shoulder flexion 0-115; scaption 0-90.  5/26/2021. .     Long Term Goals: To be accomplished in 4 weeks:  1. Patient's pain level will be 2-3/10 with activity in order to improve patient's ability to perform normal ADLs. PN: MET; . Kathe Edmonson range 0/10-2/10.  6/29/2021.    2. Patient will demonstrate PROM right shoulder flex 0-160, scaption 0-140, IR 0-60, ER 0-80 to increase ease of ADLs. PN:  PROM right shoulder flexion 0-140; scaption 0-100, ER (in 80 deg abduction) = 30 deg, IR (in 80 deg abduction) = 45 deg  6/29/21  Current: PROM right shoulder flexion 0-140 deg, scaption 0-100 deg, ER (80 deg abd) = 33 deg, IR (80 deg abd) =55 deg  7/14/21    3. Patient will increase FOTO score to 62 to indicate increased functional mobility. PN:  FOTO= 53. 6/29/21  4. Patient will be able to reach to the bottom shelf of a kitchen cabinet in order to perform normal ADLs. PN: NT due to protocol.  6/14/21       PLAN  []  Upgrade activities as tolerated     [x] Continue plan of care  []  Update interventions per flow sheet       []  Discharge due to:_  []  Other:_      Mara Jerome, PTA 7/14/2021  8:16 AM    Future Appointments   Date Time Provider Woodrow Drake   7/14/2021  8:45 AM Mohinder Cuenca, PTA [facility] [address]   7/16/2021  8:45 AM Mohinder Cuenca, PTA [facility] [address]   7/19/2021 11:00 AM Mohinder Cuenca, PTA [facility] [address]   7/21/2021  8:30 AM JAMES Mcgrath [facility] BS AMB   7/22/2021  7:15 AM Bret Carlin, PT [facility] [address]   7/26/2021 11:00 AM Tianna Monsivais, PT [facility] [address]   7/28/2021  7:15 AM Bret Carlin, PT [facility] [address]   8/2/2021 12:30 PM Mohinder Cuenca, PTA [facility] [address]   8/4/2021  7:15 AM Bret Carlin, PT [facility] [address]

## 2021-07-16 ENCOUNTER — HOSPITAL ENCOUNTER (OUTPATIENT)
Dept: PHYSICAL THERAPY | Age: 46
Discharge: HOME | End: 2021-07-16
Attending: ORTHOPAEDIC SURGERY
Payer: OTHER GOVERNMENT

## 2021-07-16 PROCEDURE — 97110 THERAPEUTIC EXERCISES: CPT

## 2021-07-16 PROCEDURE — 97112 NEUROMUSCULAR REEDUCATION: CPT

## 2021-07-16 PROCEDURE — 97140 MANUAL THERAPY 1/> REGIONS: CPT

## 2021-07-16 NOTE — PROGRESS NOTES
PT DAILY TREATMENT NOTE     Patient Name: Nicki Lucio  Date:2021  : 1975  [x]  Patient  Verified  [facility] / [facility] / Product Type: Federal Funded Programs /    In time:8:45  Out time:9:33   Total Treatment Time (min): 48  Visit #: 5 of 8    Treatment Area: Pain in right shoulder [M25.511]    SUBJECTIVE  Pain Level (0-10 scale): 1  Any medication changes, allergies to medications, adverse drug reactions, diagnosis change, or new procedure performed?: [x] No    [] Yes (see summary sheet for update)  Subjective functional status/changes:   [] No changes reported  Patient reports she tries to use her right arm more with reaching for objects. OBJECTIVE          23 min Therapeutic Exercise:  - See flow sheet :   Rationale: increase ROM, increase strength and improve coordination to improve the patients ability to increase ease with ADLs.       12 min Neuromuscular Re-education:  -  See flow sheet :empahsis on scapular stability. Rationale: increase ROM, increase strength, improve coordination, improve balance and increase proprioception  to improve the patients ability to increase ease with overhead reaching.      13 min Manual Therapy:    Supine-elbow PROM flexion/extension/pronoation/supination  Supine- shoulder PROM per protocol. Supine- right shoulder GHJ AP and inferior grade I-II    The manual therapy interventions were performed at a separate and distinct time from the therapeutic activities interventions.   Rationale: decrease pain, increase ROM, increase tissue extensibility and decrease trigger points to increase ease with dressing.                                                   With   [] TE   [] TA   [] neuro   [] other: Patient Education: [x] Review HEP    [] Progressed/Changed HEP based on:   [] positioning   [] body mechanics   [] transfers   [] heat/ice application    [] other:      Other Objective/Functional Measures: pt was able to reach 2nd shelf in clinic cabinets with a cone. Pain Level (0-10 scale) post treatment: 4    ASSESSMENT/Changes in Function: Continue to progress with AROM exercises to increase ease with shoulder elevation. Patient continues to have pain at end range of PROM of shoulder ER. Patient will continue to benefit from skilled PT services to modify and progress therapeutic interventions, address functional mobility deficits, address ROM deficits, address strength deficits, analyze and address soft tissue restrictions and analyze and cue movement patterns to attain remaining goals. []  See Plan of Care  []  See progress note/recertification  []  See Discharge Summary         Progress towards goals / Updated goals:  Short Term Goals: To be accomplished in 1 weeks:  1.  Patient will become proficient in their HEP and will be compliant in performing that program.  PN:  MET: pt reports performing HEP. 6/29/21   2.  Patient will demonstrate PROM right shoulder flex 0-120; scaption 0-100 degrees. PN:   Goal Met PROM right shoulder flexion 0-115; scaption 0-90.  5/26/2021. .     Long Term Goals: To be accomplished in 4 weeks:  1. Patient's pain level will be 2-3/10 with activity in order to improve patient's ability to perform normal ADLs. PN: MET; . [employer] range 0/10-2/10.  6/29/2021.    2. Patient will demonstrate PROM right shoulder flex 0-160, scaption 0-140, IR 0-60, ER 0-80 to increase ease of ADLs. PN:  PROM right shoulder flexion 0-140; scaption 0-100, ER (in 80 deg abduction) = 30 deg, IR (in 80 deg abduction) = 45 deg  6/29/21  Current: PROM right shoulder flexion 0-140 deg, scaption 0-100 deg, ER (80 deg abd) = 33 deg, IR (80 deg abd) =55 deg  7/14/21     3. Patient will increase FOTO score to 62 to indicate increased functional mobility. PN:  FOTO= 53. 6/29/21  4. Patient will be able to reach to the bottom shelf of a kitchen cabinet in order to perform normal ADLs. PN: NT due to protocol. 6/14/21  Current: Progressing: pt was able to reach 2nd shelf in clinic cabinets with a cone.  7/16/21       PLAN  []  Upgrade activities as tolerated     [x]  Continue plan of care  []  Update interventions per flow sheet       []  Discharge due to:_  []  Other:_      Rebel Mason PTA 7/16/2021  8:46 AM    Future Appointments   Date Time Provider Woodrow Drake   7/19/2021 11:00 AM Tanmay Malagon PTA [facility]   7/21/2021  8:30 AM JAMES Ceja BS AMB   7/22/2021  7:15 AM Masha Gan, PT [facility]   7/26/2021 11:00 AM Sawyer Vasquez PT [facility]   7/28/2021  7:15 AM Masha Gan, PT [facility]   8/2/2021 12:30 PM Tanmay Malagon PTA [facility]   8/4/2021  7:15 AM Masha Gan, PT [facility]

## 2021-07-19 ENCOUNTER — HOSPITAL ENCOUNTER (OUTPATIENT)
Dept: PHYSICAL THERAPY | Age: 46
Discharge: HOME | End: 2021-07-19
Attending: ORTHOPAEDIC SURGERY
Payer: OTHER GOVERNMENT

## 2021-07-19 PROCEDURE — 97110 THERAPEUTIC EXERCISES: CPT

## 2021-07-19 PROCEDURE — 97112 NEUROMUSCULAR REEDUCATION: CPT

## 2021-07-19 PROCEDURE — 97140 MANUAL THERAPY 1/> REGIONS: CPT

## 2021-07-19 NOTE — PROGRESS NOTES
PT DAILY TREATMENT NOTE     Patient Name: Sonal Parker  Date:2021  : 1975  [x]  Patient  Verified  Payor: [facility] / [facility] / Product Type: Federal Funded Programs /    In time:11:00  Out time:11:51  Total Treatment Time (min): 51  Visit #: 6 of 8      Treatment Area: Pain in right shoulder [M25.511]    SUBJECTIVE  Pain Level (0-10 scale): 0  Any medication changes, allergies to medications, adverse drug reactions, diagnosis change, or new procedure performed?: [x] No    [] Yes (see summary sheet for update)  Subjective functional status/changes:   [] No changes reported  Patient reports she is limited with reaching in overhead cabinets. OBJECTIVE    Modality rationale: decrease pain to improve the patients ability to decrease difficulty while performing. Min Type Additional Details   10 [x]  Ice     []  heat  []  Ice massage  []  Laser   []  Anodyne Position:sitting  Location:shoulder   [] Skin assessment post-treatment:  []intact []redness- no adverse reaction    []redness - adverse reaction:         17 min Therapeutic Exercise:  - See flow sheet :   Rationale: increase ROM, increase strength and improve coordination to improve the patients ability to increase ease with ADLs.          12 min Neuromuscular Re-education:  -  See flow sheet :empahsis on scapular stability.    Rationale: increase ROM, increase strength, improve coordination, improve balance and increase proprioception  to improve the patients ability to increase ease with overhead reaching.      12 min Manual Therapy:    Supine-elbow PROM flexion/extension/pronoation/supination  Supine- shoulder PROM per protocol. Supine- right shoulder GHJ AP and inferior grade I-II    The manual therapy interventions were performed at a separate and distinct time from the therapeutic activities interventions.   Rationale: decrease pain, increase ROM, increase tissue extensibility and decrease trigger points to increase ease with dressing.                With   [] TE   [] TA   [] neuro   [] other: Patient Education: [x] Review HEP    [] Progressed/Changed HEP based on:   [] positioning   [] body mechanics   [] transfers   [] heat/ice application    [] other:      Other Objective/Functional Measures: see goals. Pain Level (0-10 scale) post treatment: 2    ASSESSMENT/Changes in Function: Patient is progressing well post surgically. Patient has initiated AROM last week with having less pain and increase PROM with decreased pain. Patient continues to be limited with reaching overhead and behind her back. Patient will continue to benefit from skilled PT services to modify and progress therapeutic interventions, address functional mobility deficits, address ROM deficits, address strength deficits and analyze and address soft tissue restrictions to attain remaining goals. []  See Plan of Care  [x]  See progress note/recertification  []  See Discharge Summary         Progress towards goals / Updated goals:  Short Term Goals: To be accomplished in 1 weeks:  1.  Patient will become proficient in their HEP and will be compliant in performing that program.  PN:  MET: pt reports performing HEP. 6/29/21   2.  Patient will demonstrate PROM right shoulder flex 0-120; scaption 0-100 degrees. PN:   Goal Met PROM right shoulder flexion 0-115; scaption 0-90.  5/26/2021. .     Long Term Goals: To be accomplished in 4 weeks:  1. Patient's pain level will be 2-3/10 with activity in order to improve patient's ability to perform normal ADLs. PN: MET; . [place] range 0/10-2/10.  6/29/2021.    2. Patient will demonstrate PROM right shoulder flex 0-160, scaption 0-140, IR 0-60, ER 0-80 to increase ease of ADLs.   PN:  PROM right shoulder flexion 0-140; scaption 0-100, ER (in 80 deg abduction) = 30 deg, IR (in 80 deg abduction) = 45 deg  6/29/21  Current: PROM right shoulder flexion 0-140 deg, scaption 0-100 deg, ER (80 deg abd) = 33 deg, IR (80 deg abd) =55 deg  7/14/21     3. Patient will increase FOTO score to 62 to indicate increased functional mobility. PN:  FOTO= 53. 6/29/21  Current: remains: FOTO = 53. 7/19/21  4. Patient will be able to reach to the bottom shelf of a kitchen cabinet in order to perform normal ADLs. PN: NT due to protocol. 6/14/21  Current: Progressing: pt was able to reach 2nd shelf in clinic cabinets with a cone. 7/16/21    Updated Goal  1. Patient will demonstrate AROM right shoulder flex 0-150, scaption 0-140. At PN: AROM flexion 128 deg, abduction 94.   2. Patient will demonstrate functional reach IR L3 and ER T1 to increase ease with styling hair and donning a belt.    AT PN: NT    PLAN  []  Upgrade activities as tolerated     [x]  Continue plan of care  []  Update interventions per flow sheet       []  Discharge due to:_  []  Other:_      Jacey Le PTA 7/19/2021  11:02 AM    Future Appointments   Date Time Provider Woodrow Drake   7/21/2021  8:30 AM JAMES Grayson [facility] BS AMB   7/22/2021  7:15 AM Indira Faribault, PT [facility]   7/26/2021 11:00 AM Nando Tamayo, PT [facility]   7/28/2021  7:15 AM Indira Faribault, PT [facility]   8/2/2021 12:30 PM Michael Jasso, PTA [facility]   8/4/2021  7:15 AM Indira Faribault, PT [facility]

## 2021-07-19 NOTE — PROGRESS NOTES
[facility]              [address] [place], [address]   [phone] fax    Progress Note  Patient name: Ivania Gee Start of Care: 21   Referral source: Luke Tijerinama : 1975   Medical/Treatment Diagnosis: Pain in right shoulder [M25.511]  Payor: [facility] CCN / Plan: [employer] / Product Type: [employer] Programs /  Onset Date:DoS 2021     Prior Hospitalization: see medical history Provider#: [id number]   Medications: Verified on Patient Summary List    Comorbidities: Allergies, Anxiety or Panic Disorders, Back pain, Depression, Headaches, Prior  Surgery, Prosthesis / Implants, Sleep dysfunction   Prior Level of Function: She had limited ROM and painful right shoulder prior to surgery. Independent self care. Visits from Start of Care: 13    Missed Visits: 2    Established Goals:    Short Term Goals: To be accomplished in 1 weeks:  1. Patient will become proficient in their HEP and will be compliant in performing that program.  PN:  MET: pt reports performing HEP. 2.  Patient will demonstrate PROM right shoulder flex 0-120; scaption 0-100 degrees. PN:   Goal Met PROM right shoulder flexion 0-115; scaption 0-90. Long Term Goals: To be accomplished in 4 weeks:  1. Patient's pain level will be 2-3/10 with activity in order to improve patient's ability to perform normal ADLs. PN: MET:  pain range 0/10-2/10.     2. Patient will demonstrate PROM right shoulder flex 0-160, scaption 0-140, IR 0-60, ER 0-80 to increase ease of ADLs. PN:  PROM right shoulder flexion 0-140; scaption 0-100, ER (in 80 deg abduction) = 30 deg, IR (in 80 deg abduction) = 45 deg  21  Current: PROM right shoulder flexion 0-140 deg, scaption 0-100 deg, ER (80 deg abd) = 33 deg, IR (80 deg abd) =55. Goal Met.     3. Patient will increase FOTO score to 62 to indicate increased functional mobility. PN:  FOTO= 53. 6/29/21  Current: remains: FOTO = 53. No change. 4. Patient will be able to reach to the bottom shelf of a kitchen cabinet in order to perform normal ADLs. PN: NT due to protocol. Current: Progressing: pt was able to reach 2nd shelf in clinic cabinets with a cone. Key Functional Changes: see goals above    Updated Goals: to be achieved in 4 weeks:  1. Patient will demonstrate AROM right shoulder flex 0-150, scaption 0-140. At PN: AROM flexion 128 deg, abduction 94.   2. Patient will demonstrate functional reach IR L3 and ER T1 to increase ease with styling hair and donning a belt. AT PN: NT    ASSESSMENT/RECOMMENDATIONS:  Patient is progressing well post surgically. Patient initiated AROM last week per protocol and is having less pain. She has increased PROM with decreased pain. Patient continues to be limited with reaching overhead and behind her back. Patient will continue to benefit from skilled PT services to modify and progress therapeutic interventions, address functional mobility deficits, address ROM deficits, address strength deficits and analyze and address soft tissue restrictions to attain remaining goals.        [x]Continue therapy per initial plan/protocol at a frequency of  2 x per week for 6 weeks  []Continue therapy with the following recommended changes:_____________________      _____________________________________________________________________  []Discontinue therapy progressing towards or have reached established goals  []Discontinue therapy due to lack of appreciable progress towards goals  []Discontinue therapy due to lack of attendance or compliance  []Await Physician's recommendations/decisions regarding therapy  []Other:________________________________________________________________    Thank you for this referral.    Rebel Mason PTA 7/19/2021 11:30 AM  NOTE TO PHYSICIAN:  PLEASE COMPLETE THE ORDERS BELOW AND   [facility]: [phone]  If you are unable to process this request in 24 hours please contact our office: [phone]    []  I have read the above report and request that my patient continue as recommended. []  I have read the above report and request that my patient continue therapy with the following changes/special instructions:________________________________________  []I have read the above report and request that my patient be discharged from therapy.     Physician's Signature:____________Date:_________TIME:________     JAMES Gardiner  ** Signature, Date and Time must be completed for valid certification **

## 2021-07-22 ENCOUNTER — HOSPITAL ENCOUNTER (OUTPATIENT)
Dept: PHYSICAL THERAPY | Age: 46
Discharge: HOME | End: 2021-07-22
Attending: ORTHOPAEDIC SURGERY
Payer: OTHER GOVERNMENT

## 2021-07-22 PROCEDURE — 97112 NEUROMUSCULAR REEDUCATION: CPT | Performed by: PHYSICAL THERAPIST

## 2021-07-22 PROCEDURE — 97140 MANUAL THERAPY 1/> REGIONS: CPT | Performed by: PHYSICAL THERAPIST

## 2021-07-22 PROCEDURE — 97110 THERAPEUTIC EXERCISES: CPT | Performed by: PHYSICAL THERAPIST

## 2021-07-22 NOTE — PROGRESS NOTES
PT DAILY TREATMENT NOTE     Patient Name: Puja Jordan  Date:2021  : 1975  [x]  Patient  Verified  Payor: [facility] CCN / Plan: [facility] CCN / Product Type: Federal Funded Programs /    In time:7:12  Out time:7:52  Total Treatment Time (min): 40  Visit #: 1 of 12    Treatment Area: Pain in right shoulder [M25.511]    SUBJECTIVE  Pain Level (0-10 scale): 0  Any medication changes, allergies to medications, adverse drug reactions, diagnosis change, or new procedure performed?: [x] No    [] Yes (see summary sheet for update)  Subjective functional status/changes:   [] No changes reported  Patient notes she is left handed so she naturally does more with her left hand than her right. She has to make an effort to use her right arm more. OBJECTIVE    23 min Therapeutic Exercise:  [] See flow sheet :Emphasis on increasing AROM and strength of the right wrist, forearm, elbow. Rationale: increase ROM and increase strength to improve the patients ability to increase her functional activity level. 8 min Neuromuscular Re-education:  []  See flow sheet :Emphasis on recruitment and activation of scapular muscles to provide stability   Rationale: increase strength and increase proprioception  to improve the patients ability to increase patients ADLs. 9 min Manual Therapy:  GH joint mobs in supine; grade II-IV anterior and posterior glides. The manual therapy interventions were performed at a separate and distinct time from the therapeutic activities interventions. Rationale: increase ROM and increase tissue extensibility to increase ease of motion to improve function. With   [] TE   [] TA   [] neuro   [] other: Patient Education: [x] Review HEP    [] Progressed/Changed HEP based on:   [] positioning   [] body mechanics   [] transfers   [] heat/ice application    [] other:      Other Objective/Functional Measures: Capsular end feel with rotation.   Most limited into ER. Pain Level (0-10 scale) post treatment: 2    ASSESSMENT/Changes in Function: Patient continues to lack functional strength and ROM for normal ADLs. Patient will continue to benefit from skilled PT services to modify and progress therapeutic interventions, address functional mobility deficits, address ROM deficits, address strength deficits and analyze and address soft tissue restrictions to attain remaining goals. [x]  See Plan of Care  []  See progress note/recertification  []  See Discharge Summary         Progress towards goals / Updated goals:  Short Term Goals: To be accomplished in 1 weeks:  1.  Patient will become proficient in their HEP and will be compliant in performing that program.  PN:  MET: pt reports performing HEP. 6/29/21   2.  Patient will demonstrate PROM right shoulder flex 0-120; scaption 0-100 degrees. PN:   Goal Met PROM right shoulder flexion 0-115; scaption 0-90.  5/26/2021. .     Long Term Goals: To be accomplished in 4 weeks:  1. Patient's pain level will be 2-3/10 with activity in order to improve patient's ability to perform normal ADLs. PN: MET; . [facility] range 0/10-2/10.  6/29/2021.    2. Patient will demonstrate PROM right shoulder flex 0-160, scaption 0-140, IR 0-60, ER 0-80 to increase ease of ADLs. PN:  PROM right shoulder flexion 0-140; scaption 0-100, ER (in 80 deg abduction) = 30 deg, IR (in 80 deg abduction) = 45 deg  6/29/21  Current: PROM right shoulder flexion 0-140 deg, scaption 0-100 deg, ER (80 deg abd) = 33 deg, IR (80 deg abd) =55 deg  7/14/21. Progressin     3. Patient will increase FOTO score to 62 to indicate increased functional mobility. PN:  FOTO= 53. 6/29/21  Current: remains: FOTO = 53. 7/19/21  4. Patient will be able to reach to the bottom shelf of a kitchen cabinet in order to perform normal ADLs. PN: NT due to protocol. 6/14/21  Current: Patient notes she can reach up to the microwave and the bottom shelf of her kitchen cabinet. 7/22/2021. Goal Met.     Updated Goal  1. Patient will demonstrate AROM right shoulder flex 0-150, scaption 0-140. At PN: AROM flexion 128 deg, abduction 94.   2. Patient will demonstrate functional reach IR L3 and ER T1 to increase ease with styling hair and donning a belt.    AT PN: NT    PLAN  [x]  Upgrade activities as tolerated     [x]  Continue plan of care  []  Update interventions per flow sheet       []  Discharge due to:_  []  Other:_      Josie Martinez PT 7/22/2021  7:13 AM    Future Appointments   Date Time Provider Woodrow Drake   7/22/2021  7:15 AM Jh Mai, PT [facility]   7/26/2021 11:00 AM Kellee Perez [facility]   7/28/2021  7:15 AM Jh Mai, PT [facility]   7/28/2021  8:30 AM MD NED Cotter BS AMB   8/2/2021 12:30 PM Salvador Tatum PTA [facility]   8/4/2021  7:15 AM Jh Mai PT [facility]

## 2021-07-26 ENCOUNTER — HOSPITAL ENCOUNTER (OUTPATIENT)
Dept: PHYSICAL THERAPY | Age: 46
Discharge: HOME | End: 2021-07-26
Attending: ORTHOPAEDIC SURGERY
Payer: OTHER GOVERNMENT

## 2021-07-26 PROCEDURE — 97140 MANUAL THERAPY 1/> REGIONS: CPT

## 2021-07-26 PROCEDURE — 97110 THERAPEUTIC EXERCISES: CPT

## 2021-07-26 PROCEDURE — 97112 NEUROMUSCULAR REEDUCATION: CPT

## 2021-07-26 NOTE — PROGRESS NOTES
PT DAILY TREATMENT NOTE     Patient Name: Brit Lopez  Date:2021  : 1975  [x]  Patient  Verified  Payor: [facility] / Plan: [facility] CCN / Product Type: Federal Funded Programs /    In time:11:04  Out time:11:58  Total Treatment Time (min): 47  Visit #: 2 of 12      Treatment Area: Pain in right shoulder [M25.511]    SUBJECTIVE  Pain Level (0-10 scale): 0  Any medication changes, allergies to medications, adverse drug reactions, diagnosis change, or new procedure performed?: [x] No    [] Yes (see summary sheet for update)  Subjective functional status/changes:   [] No changes reported  Patient reports she has been able to use her right arm to perform more tasks. OBJECTIVE            Modality rationale: decrease pain to improve the patients ability to decrease difficulty while performing. Min Type Additional Details    10 [x]? Ice     []?  heat  []? Ice massage  []? Laser   []? Anodyne Position:sitting  Location:shoulder    []? Skin assessment post-treatment:  []?intact []? redness- no adverse reaction    []? redness - adverse reaction:         24 min Therapeutic Exercise:  - See flow sheet :   Rationale: increase ROM, increase strength and improve coordination to improve the patients ability to increase ease with ADLs.          10 min Neuromuscular Re-education:  -  See flow sheet :empahsis on scapular stability.    Rationale: increase ROM, increase strength, improve coordination, improve balance and increase proprioception  to improve the patients ability to increase ease with overhead reaching.      10 min Manual Therapy:    Supine-elbow PROM flexion/extension/pronoation/supination  Supine- shoulder PROM per protocol. Supine- right shoulder GHJ AP and inferior grade I-II    The manual therapy interventions were performed at a separate and distinct time from the therapeutic activities interventions.   Rationale: decrease pain, increase ROM, increase tissue extensibility and decrease trigger points to increase ease with dressing.            With   [] TE   [] TA   [] neuro   [] other: Patient Education: [x] Review HEP    [] Progressed/Changed HEP based on:   [] positioning   [] body mechanics   [] transfers   [] heat/ice application    [] other:      Other Objective/Functional Measures:  functional IR T10, functional ER T2.      Pain Level (0-10 scale) post treatment: 2    ASSESSMENT/Changes in Function: Patient has increase pain and limited with PROM right shoulder ER. Patient is able to reach the 3rd shelf with cone reaches when last week she was only able to reach 2nd shelf. Patient will continue to benefit from skilled PT services to modify and progress therapeutic interventions, address functional mobility deficits, address ROM deficits, address strength deficits and analyze and address soft tissue restrictions to attain remaining goals. []  See Plan of Care  []  See progress note/recertification  []  See Discharge Summary         Progress towards goals / Updated goals:  Short Term Goals: To be accomplished in 1 weeks:  1.  Patient will become proficient in their HEP and will be compliant in performing that program.  PN:  MET: pt reports performing HEP. 6/29/21   2.  Patient will demonstrate PROM right shoulder flex 0-120; scaption 0-100 degrees. PN:   Goal Met PROM right shoulder flexion 0-115; scaption 0-90.  5/26/2021. .     Long Term Goals: To be accomplished in 4 weeks:  1. Patient's pain level will be 2-3/10 with activity in order to improve patient's ability to perform normal ADLs. PN: MET; . Gianna Southward range 0/10-2/10.  6/29/2021.    2. Patient will demonstrate PROM right shoulder flex 0-160, scaption 0-140, IR 0-60, ER 0-80 to increase ease of ADLs. PN: PROM right shoulder flexion 0-140 deg, scaption 0-100 deg, ER (80 deg abd) = 33 deg, IR (80 deg abd) =55 deg  7/14/21. Progressin     3.  Patient will increase FOTO score to 62 to indicate increased functional mobility. PN:  FOTO= 53. 6/29/21  Current: remains: FOTO = 53. 7/19/21  4. Patient will be able to reach to the bottom shelf of a kitchen cabinet in order to perform normal ADLs. PN: Goal Met. Patient notes she can reach up to the microwave and the bottom shelf of her kitchen cabinet. 7/22/2021.       Updated Goal  1. Patient will demonstrate AROM right shoulder flex 0-150, scaption 0-140. At PN: AROM flexion 128 deg, abduction 94.   2. Patient will demonstrate functional reach IR L3 and ER T1 to increase ease with styling hair and donning a belt.    AT PN: NT  Current: MET: functional IR T10, functional ER T2. 7/26/21      PLAN  []  Upgrade activities as tolerated     [x]  Continue plan of care  []  Update interventions per flow sheet       []  Discharge due to:_  []  Other:_      Halie Kauffman PTA 7/26/2021  11:05 AM    Future Appointments   Date Time Provider Woodrow Drake   7/28/2021  7:15 AM Randall Lam PT [facility]   7/28/2021  8:30 AM MD NED Reyes BS AMB   8/2/2021 12:30 PM Yuri Jerez PTA [facility]   8/4/2021  7:15 AM Randall Lam PT [facility]

## 2021-07-28 ENCOUNTER — APPOINTMENT (OUTPATIENT)
Dept: PHYSICAL THERAPY | Age: 46
End: 2021-07-28
Attending: ORTHOPAEDIC SURGERY
Payer: OTHER GOVERNMENT

## 2021-07-28 ENCOUNTER — OFFICE VISIT (OUTPATIENT)
Dept: ORTHOPEDIC SURGERY | Age: 46
End: 2021-07-28
Payer: OTHER GOVERNMENT

## 2021-07-28 VITALS
HEART RATE: 77 BPM | HEIGHT: 67 IN | OXYGEN SATURATION: 97 % | RESPIRATION RATE: 18 BRPM | TEMPERATURE: 98.2 F | WEIGHT: 184.2 LBS | BODY MASS INDEX: 28.91 KG/M2

## 2021-07-28 DIAGNOSIS — M75.111 NONTRAUMATIC INCOMPLETE TEAR OF RIGHT ROTATOR CUFF: Primary | ICD-10-CM

## 2021-07-28 PROCEDURE — 99213 OFFICE O/P EST LOW 20 MIN: CPT | Performed by: ORTHOPAEDIC SURGERY

## 2021-07-28 NOTE — PROGRESS NOTES
Patient: Abbie Hayes                MRN: [id number]       SSN: [id number]  YOB: 1975        AGE: 48 y.o. SEX: female  Body mass index is 28.85 kg/m². PCP: Yaya Churchill, Not On File  07/28/21    Chief Complaint: Right shoulder follow up 1/10 pain    HPI: Abbie Hayes is a 48 y.o. female patient who returns to the office today for her right shoulder. She is now over 3 months out from her right shoulder arthroscopic rotator cuff repair. Overall she is doing well. She is in third physical therapy. She rates her pain as low on most days. Past Medical History:   Diagnosis Date    Anxiety     Anxiety     Depression     Insomnia     Migraine     PTSD (post-traumatic stress disorder)        Family History   Problem Relation Age of Onset    Cancer Sister         panreatic    Diabetes Mother     Heart Disease Mother        Current Outpatient Medications   Medication Sig Dispense Refill    diclofenac (Voltaren) 1 % gel Apply 4 g to affected area four (4) times daily. 100 g 3    calcium citrate 200 mg (950 mg) tablet Take  by mouth daily.  FERROUS SULFATE PO Take 1 Tab by mouth two (2) times a day.  ibuprofen (MOTRIN) 600 mg tablet Take 1 tablet by mouth every six (6) hours as needed for Pain. 20 tablet 0    traZODone (DESYREL) 100 mg tablet Take 100 mg by mouth nightly.          Allergies   Allergen Reactions    Pcn [Penicillins] Angioedema       Past Surgical History:   Procedure Laterality Date    HX BREAST BIOPSY      left breast    [place] ANESTH,SURGERY OF SHOULDER      torn rotator cuff and ligament repair       Social History     Socioeconomic History    Marital status:      Spouse name: Not on file    Number of children: Not on file    Years of education: Not on file    Highest education level: Not on file   Occupational History    Not on file   Tobacco Use    Smoking status: Never Smoker    Smokeless tobacco: Never Used   Substance and Sexual Activity    Alcohol use: No    Drug use: No    Sexual activity: Not on file   Other Topics Concern    Not on file   Social History Narrative    Not on file     Social Determinants of Health     Financial Resource Strain:     Difficulty of Paying Living Expenses:    Food Insecurity:     Worried About Running Out of Food in the Last Year:     [address] in the Last Year:    Transportation Needs:     Lack of Transportation (Medical):  Lack of Transportation (Non-Medical):    Physical Activity:     Days of Exercise per Week:     Minutes of Exercise per Session:    Stress:     Feeling of Stress :    Social Connections:     Frequency of Communication with Friends and Family:     Frequency of Social Gatherings with Friends and Family:     Attends Synagogue Services:     Active Member of Clubs or Organizations:     Attends Club or Organization Meetings:     Marital Status:    Intimate Partner Violence:     Fear of Current or Ex-Partner:     Emotionally Abused:     Physically Abused:     Sexually Abused:        REVIEW OF SYSTEMS:      No changes from previous review of systems unless noted. PHYSICAL EXAMINATION:  Visit Vitals  Pulse 77   Temp 98.2 °F (36.8 °C) (Temporal)   Resp 18   Ht 5' 7\" (1.702 m)   Wt 184 lb 3.2 oz (83.6 kg)   SpO2 97%   BMI 28.85 kg/m²     Body mass index is 28.85 kg/m². GENERAL: Alert and oriented x3, in no acute distress. HEENT: Normocephalic, atraumatic. RESP: Non labored breathing. SKIN: No rashes or lesions noted.    Shoulder Examination     R   L  ROM   FF  Full   Full  ER  Full   Full   IR  Full   Full  Rotator Cuff Pain   Supra  +   -   Infra  -   -   Subscap -   -  Crepitus  -   -  Effusion  -   -  Warmth  -   -   Erythema  -   -  Instability  -   -  AC Joint TTP  -   -  Clavicle   Deformity -   -   TTP  -   -  Proximal Humerus   Deformity -   -   TTP  -   -  Deltoid Strength 5   5  Biceps Strength 5   5  Biceps Deformity -   -  Biceps Groove Pain -   -  Impingement Sign -   -       IMAGING:  No imaging today    ASSESSMENT & PLAN  Diagnosis: Status post right shoulder arthroscopic rotator cuff repair, over 3 months    I recommended that can continue physical therapy for at least another 6 weeks where they are working on strengthening of her right shoulder. I will plan to see her back in about 6 weeks. Electronically signed by: Linda Reid MD    Note: This note was completed using voice recognition software.   Any typographical/name errors or mistakes are unintentional.

## 2021-08-02 ENCOUNTER — APPOINTMENT (OUTPATIENT)
Dept: PHYSICAL THERAPY | Age: 46
End: 2021-08-02
Attending: ORTHOPAEDIC SURGERY
Payer: OTHER GOVERNMENT

## 2021-08-04 ENCOUNTER — APPOINTMENT (OUTPATIENT)
Dept: PHYSICAL THERAPY | Age: 46
End: 2021-08-04
Attending: ORTHOPAEDIC SURGERY
Payer: OTHER GOVERNMENT

## 2021-08-18 ENCOUNTER — APPOINTMENT (OUTPATIENT)
Dept: PHYSICAL THERAPY | Age: 46
End: 2021-08-18
Attending: ORTHOPAEDIC SURGERY
Payer: OTHER GOVERNMENT

## 2021-08-20 ENCOUNTER — APPOINTMENT (OUTPATIENT)
Dept: PHYSICAL THERAPY | Age: 46
End: 2021-08-20
Attending: ORTHOPAEDIC SURGERY
Payer: OTHER GOVERNMENT

## 2021-08-31 ENCOUNTER — HOSPITAL ENCOUNTER (OUTPATIENT)
Dept: PHYSICAL THERAPY | Age: 46
Discharge: HOME | End: 2021-08-31
Attending: ORTHOPAEDIC SURGERY
Payer: OTHER GOVERNMENT

## 2021-08-31 PROCEDURE — 97112 NEUROMUSCULAR REEDUCATION: CPT

## 2021-08-31 PROCEDURE — 97530 THERAPEUTIC ACTIVITIES: CPT

## 2021-08-31 PROCEDURE — 97110 THERAPEUTIC EXERCISES: CPT

## 2021-08-31 NOTE — PROGRESS NOTES
[facility] - [facility]              [address]        [place], [address] [place]   [phone] fax    Physician Update  [x] Progress Note  [] Discharge Summary  Patient name: Cherelle Lewis Start of Care: 21   Referral source: Ravindra Abarca Jameslizandroma : 1975   Medical/Treatment Diagnosis: Pain in right shoulder [M25.511]  Payor: [facility] CCN / Plan: [employer] / Product Type: [employer] Programs /  Onset Date: DoS 2021        Prior Hospitalization: see medical history Provider#: [id number]   Medications: Verified on Patient Summary List    Comorbidities: Allergies, Anxiety or Panic Disorders, Back pain, Depression, Headaches, Prior  Surgery, Prosthesis / Implants, Sleep dysfunction  Prior Level of Function: She had limited ROM and painful right shoulder prior to surgery.  Independent self care. Visits from Start of Care: 16    Missed Visits: 3    Status at Evaluation/Last Progress Note:   Progress towards goals / Updated goals:  Updated Goal  1. Patient will demonstrate AROM right shoulder flex 0-150, scaption 0-140. At PN: AROM flexion 128 deg, abduction 94. Current: Progressing; Right shoulder AROM: Flex: 129deg Abduction: 96deg with report of 4/10 pain level in both planes. (21)    2. Patient will demonstrate functional reach IR L3 and ER T1 to increase ease with styling hair and donning a belt. AT PN: NT  Current: MET: functional IR T10, functional ER T2. 21    Minimal progress noted due to patient has only attended 2 sessions of PT since last PN on 21 due to a delay in insurance authorization. Patient reports average pain level in right shoulder is a 2/10. Patient reports difficulty with lifting arm, and stiffness due to not being able to do PT for the last month.      Goals: to be achieved in 4 weeks:   1. Patient will demonstrate AROM right shoulder flex 0-145, scaption 0-120 to increase ease with ADLs.  At PN: Progressing; Right shoulder AROM: Flex: 129deg Abduction: 96deg with report of 4/10 pain level in both planes. (8/31/21)   2. Patient will report average pain level in right shoulder to be no more then 1/10 to increase ease with household management. At PN: Patient reports average pain level at 2/10 (8/31/21)      ASSESSMENT/RECOMMENDATIONS: Patient will continue to benefit from skilled PT services to modify and progress therapeutic interventions, address functional mobility deficits, address ROM deficits, address strength deficits, analyze and address soft tissue restrictions, analyze and cue movement patterns, analyze and modify body mechanics/ergonomics and assess and modify postural abnormalities to attain remaining goals. [x]Continue therapy per initial plan/protocol at a frequency of  2 x per week for 4 weeks  []Continue therapy with the following recommended changes:_____________________      _____________________________________________________________________  []Discontinue therapy progressing towards or have reached established goals  []Discontinue therapy due to lack of appreciable progress towards goals  []Discontinue therapy due to lack of attendance or compliance  []Await Physician's recommendations/decisions regarding therapy  []Other:________________________________________________________________    Thank you for this referral.    Stephanie Shukla, PT 8/31/2021 8:47 AM    NOTE TO PHYSICIAN:  PLEASE COMPLETE THE ORDERS BELOW AND   FAX TO [facility]: [phone]  If you are unable to process this request in 24 hours please contact our office: [phone]    []  I have read the above report and request that my patient continue as recommended.   []  I have read the above report and request that my patient continue therapy with the following changes/special instructions:________________________________________  []I have read the above report and request that my patient be discharged from therapy.     Physician's Signature:____________Date:_________TIME:________     JAMES Wallace  ** Signature, Date and Time must be completed for valid certification **

## 2021-08-31 NOTE — PROGRESS NOTES
PT DAILY TREATMENT NOTE     Patient Name: Monica Gonzalez  Date:2021  : 1975  [x]  Patient  Verified  [facility] / Product Type: Federal Funded Programs /    In time:8:05  Out time: 8:32  Total Treatment Time (min): 27  Visit #: 3 of 12    Treatment Area: Pain in right shoulder [M25.511]    SUBJECTIVE  Pain Level (0-10 scale): 2/10  Any medication changes, allergies to medications, adverse drug reactions, diagnosis change, or new procedure performed?: [x] No    [] Yes (see summary sheet for update)  Subjective functional status/changes:   [] No changes reported  Patient reports average pain level in right shoulder is a 2/10. Patient reports no changes since last seen on 21. Patient stated she hasn't been back to to PT due to insurance authorization delay. Patient reports difficulty with lifting arm, and stiffness due to not being able to do PT for the last month.       Patient stated she has to leave PT by 8:30   OBJECTIVE    Modality rationale: PD   Min Type Additional Details    [] Estim:  []Unatt       []IFC  []Premod                        []Other:  []w/ice   []w/heat  Position:  Location:    [] Estim: []Att    []TENS instruct  []NMES                    []Other:  []w/US   []w/ice   []w/heat  Position:  Location:    []  Traction: [] Cervical       []Lumbar                       [] Prone          []Supine                       []Intermittent   []Continuous Lbs:  [] before manual  [] after manual    []  Ultrasound: []Continuous   [] Pulsed                           []1MHz   []3MHz W/cm2:  Location:    []  Iontophoresis with dexamethasone         Location: [] Take home patch   [] In clinic    []  Ice     []  heat  []  Ice massage  []  Laser   []  Anodyne Position:  Location:    []  Laser with stim  []  Other:  Position:  Location:    []  Vasopneumatic Device    []  Right     []  Left  Pre-treatment girth:  Post-treatment girth:  Measured at (location):  Pressure:       [] lo [] med [] hi   Temperature: [] lo [] med [] hi   [] Skin assessment post-treatment:  []intact []redness- no adverse reaction    []redness - adverse reaction:       11 min Therapeutic Exercise:  [x] See flow sheet : Focus on improving available right UE ROM and strength    Rationale: increase ROM and increase strength to improve the patients ability to perform ADls safely     8 min Therapeutic Activity:  [x]  See flow sheet : overhead reach emphasizing good form without UT compensatory strategies. Discussion of functional abilities and what continues to be a challenge. Rationale: increase ROM, increase strength and improve coordination  to improve the patients ability to perform overhead functional tasks to increase ease with household management. 8 min Neuromuscular Re-education:  [x]  See flow sheet : focus on improving scapular musculature activation to improve scapulohumeral rhythm    Rationale: increase strength, improve coordination and increase proprioception  to improve the patients ability to perform community ADLs safely. With   [] TE   [] TA   [] neuro   [] other: Patient Education: [x] Review HEP    [] Progressed/Changed HEP based on:   [] positioning   [] body mechanics   [] transfers   [] heat/ice application    [] other:      Other Objective/Functional Measures:    Right shoulder AROM: Flex: 129deg Abduction: 96deg with report of 4/10 pain level in both planes. Noted UT compensatory stratgies as ROM increased. Pain Level (0-10 scale) post treatment: 3/10     ASSESSMENT/Changes in Function:  Minimal progress noted due to patient has only attended 2 sessions of PT since last PN on 07/19/21 due to a delay in insurance authorization. Patient was able to complete exercises with only a mild increase in soreness at right shoulder at end of the session.  Patient stated it was combination of doing all the exercises that contributed to the increase in soreness and not just one particular activity. Therapist noted during right shoulder AROM in standing position patient tended to utilize more UT compensatory strategies, but form improved when in supine or sidelying and with AAROM. Patient will continue to benefit from skilled PT services to modify and progress therapeutic interventions, address functional mobility deficits, address ROM deficits, address strength deficits, analyze and address soft tissue restrictions, analyze and cue movement patterns, analyze and modify body mechanics/ergonomics and assess and modify postural abnormalities to attain remaining goals. []  See Plan of Care  [x]  See progress note/recertification  []  See Discharge Summary         Progress towards goals / Updated goals:  Updated Goal  1. Patient will demonstrate AROM right shoulder flex 0-150, scaption 0-140. At PN: AROM flexion 128 deg, abduction 94. Current: Progressing; Right shoulder AROM: Flex: 129deg Abduction: 96deg with report of 4/10 pain level in both planes. (8/31/21)    2. Patient will demonstrate functional reach IR L3 and ER T1 to increase ease with styling hair and donning a belt.    AT PN: NT  Current: MET: functional IR T10, functional ER T2. 7/26/21    PLAN  []  Upgrade activities as tolerated     [x]  Continue plan of care  []  Update interventions per flow sheet       []  Discharge due to:_  []  Other:_      Shira Mazariegos PT 8/31/2021  8:07 AM    Future Appointments   Date Time Provider Woodrow Drake   9/1/2021  7:15 AM Thanken Mcallister, PT [facility]   9/8/2021  7:15 AM Thanken Mcallister, PT [facility]   9/10/2021  2:00 PM Ofelia Musca, PTA [facility]   9/14/2021  8:00 AM Ofelia Musca, PTA [facility]   9/15/2021  7:15 AM Thana Esvin, PT [facility]   9/15/2021  8:45 AM MD JEN AlexMD BS AMB   9/21/2021  7:15 AM Ofelia Phil, PTA [facility]   9/22/2021  7:15 AM Jaswinder Mcallister, PT [facility]   9/28/2021  7:15 AM Ofelia Villarreal PTA [facility]   9/29/2021  7:15 AM Mary Kate Amor PT [facility]

## 2021-09-01 ENCOUNTER — HOSPITAL ENCOUNTER (OUTPATIENT)
Dept: PHYSICAL THERAPY | Age: 46
Discharge: HOME | End: 2021-09-01
Attending: ORTHOPAEDIC SURGERY
Payer: OTHER GOVERNMENT

## 2021-09-01 PROCEDURE — 97140 MANUAL THERAPY 1/> REGIONS: CPT | Performed by: PHYSICAL THERAPIST

## 2021-09-01 PROCEDURE — 97110 THERAPEUTIC EXERCISES: CPT | Performed by: PHYSICAL THERAPIST

## 2021-09-01 PROCEDURE — 97112 NEUROMUSCULAR REEDUCATION: CPT | Performed by: PHYSICAL THERAPIST

## 2021-09-01 NOTE — PROGRESS NOTES
PT DAILY TREATMENT NOTE     Patient Name: Patria Esqueda  Date:2021  : 1975  [x]  Patient  Verified  Payor: [facility] CCN / Plan: [facility] CCN / Product Type: Federal Funded Programs /    In time:7:17  Out time:8:08  Total Treatment Time (min): 51  Visit #: 1 of 8    Treatment Area: Pain in right shoulder [M25.511]    SUBJECTIVE  Pain Level (0-10 scale): 0  Any medication changes, allergies to medications, adverse drug reactions, diagnosis change, or new procedure performed?: [x] No    [] Yes (see summary sheet for update)  Subjective functional status/changes:   [] No changes reported  Patient reports that her shoulder has been feeling better. Still some stiffness and discomfort. OBJECTIVE    Modality rationale: decrease edema, decrease inflammation and decrease pain to improve the patients ability to increase tolerance to activity.    Min Type Additional Details    [] Estim:  []Unatt       []IFC  []Premod                        []Other:  []w/ice   []w/heat  Position:  Location:    [] Estim: []Att    []TENS instruct  []NMES                    []Other:  []w/US   []w/ice   []w/heat  Position:  Location:    []  Traction: [] Cervical       []Lumbar                       [] Prone          []Supine                       []Intermittent   []Continuous Lbs:  [] before manual  [] after manual    []  Ultrasound: []Continuous   [] Pulsed                           []1MHz   []3MHz W/cm2:  Location:    []  Iontophoresis with dexamethasone         Location: [] Take home patch   [] In clinic   10 [x]  Ice     []  heat  []  Ice massage  []  Laser   []  Anodyne Position: sitting  Location: right shoulder    []  Laser with stim  []  Other:  Position:  Location:    []  Vasopneumatic Device    []  Right     []  Left  Pre-treatment girth:  Post-treatment girth:  Measured at (location):  Pressure:       [] lo [] med [] hi   Temperature: [] lo [] med [] hi   [] Skin assessment post-treatment: []intact []redness- no adverse reaction    []redness - adverse reaction:       23 min Therapeutic Exercise:  [] See flow sheet :Emphasis on increasing AROM and UE strength. Rationale: increase ROM and increase strength to improve the patients ability to increase her functional activity level. 9 min Neuromuscular Re-education:  []  See flow sheet :Emphasis on recruitment and activation of scapular muscles to provide stability   Rationale: increase strength, improve coordination and increase proprioception  to improve the patients ability to increase functional ADLs. 9 min Manual Therapy:  [address] joint mobs in supine; grade III, IV inferior, anterior and posterior glides. Grade I, II distraction. Manual stretching to increase elevation and rotation. The manual therapy interventions were performed at a separate and distinct time from the therapeutic activities interventions. Rationale: decrease pain, increase ROM and increase tissue extensibility to increase ease of motion to improve function. With   [] TE   [] TA   [] neuro   [] other: Patient Education: [x] Review HEP    [] Progressed/Changed HEP based on:   [] positioning   [] body mechanics   [] transfers   [] heat/ice application    [] other:      Other Objective/Functional Measures: Capsular end feel to IR and ER. Elevation is [facility] passively. Pain Level (0-10 scale) post treatment: 3    ASSESSMENT/Changes in Function: Patient with improving ROM but still limited in rotation with this affecting some aspects of dressing. Patient will continue to benefit from skilled PT services to modify and progress therapeutic interventions, address functional mobility deficits, address ROM deficits, address strength deficits, analyze and address soft tissue restrictions, analyze and cue movement patterns, analyze and modify body mechanics/ergonomics and assess and modify postural abnormalities to attain remaining goals.      [x]  See Plan of Care  [] See progress note/recertification  []  See Discharge Summary         Progress towards goals / Updated goals:  1. Patient will demonstrate AROM right shoulder flex 0-145, scaption 0-120 to increase ease with ADLs. At PN: Progressing; Right shoulder AROM: Flex: 129deg Abduction: 96deg with report of 4/10 pain level in both planes. (8/31/21)   2. Patient will report average pain level in right shoulder to be no more then 1/10 to increase ease with household management.   At PN: Patient reports average pain level at 2/10 (8/31/21)      PLAN  [x]  Upgrade activities as tolerated     [x]  Continue plan of care  []  Update interventions per flow sheet       []  Discharge due to:_  []  Other:_      Allison Duenas, PT 9/1/2021  7:20 AM    Future Appointments   Date Time Provider Woodrow Drake   9/8/2021  7:15 AM Tex Sierra, PT [facility]   9/10/2021  2:00 PM Tanisha Chilel, PTA [facility]   9/14/2021  8:00 AM Tanisha Chilel, PTA [facility]   9/15/2021  7:15 AM Tex Sierra, PT [facility]   9/15/2021  8:45 AM Chinyere Anna MD [facility] BS AMB   9/21/2021  7:15 AM Tanisha Chilel, PTA [facility]   9/22/2021  7:15 AM [facility] Sierra, [facility]   9/28/2021  7:15 AM [facility] Chilel, [facility]   9/29/2021  7:15 AM Tex Sierra, PT [facility]

## 2021-09-08 ENCOUNTER — HOSPITAL ENCOUNTER (OUTPATIENT)
Dept: PHYSICAL THERAPY | Age: 46
Discharge: HOME | End: 2021-09-08
Attending: ORTHOPAEDIC SURGERY
Payer: OTHER GOVERNMENT

## 2021-09-08 PROCEDURE — 97110 THERAPEUTIC EXERCISES: CPT

## 2021-09-08 PROCEDURE — 97140 MANUAL THERAPY 1/> REGIONS: CPT

## 2021-09-08 NOTE — PROGRESS NOTES
PT DAILY TREATMENT NOTE     Patient Name: Bianca Small  Date:2021  : 1975  [x]  Patient  Verified  Payor: [facility] CCN / Plan: [facility] CCN / Product Type: Federal Funded Programs /    In [place] time:804  Total Treatment Time (min): 49  Visit #: 2 of 8    Treatment Area: Pain in right shoulder [M25.511]    SUBJECTIVE  Pain Level (0-10 scale): 0/10  Any medication changes, allergies to medications, adverse drug reactions, diagnosis change, or new procedure performed?: [x] No    [] Yes (see summary sheet for update)  Subjective functional status/changes:   [] No changes reported  Pt stated that her shoulder is feeling pretty good today    OBJECTIVE    Modality rationale: decrease inflammation and decrease pain to improve the patients ability to increase ease with ADLs   Min Type Additional Details    [] Estim:  []Unatt       []IFC  []Premod                        []Other:  []w/ice   []w/heat  Position:  Location:    [] Estim: []Att    []TENS instruct  []NMES                    []Other:  []w/US   []w/ice   []w/heat  Position:  Location:    []  Traction: [] Cervical       []Lumbar                       [] Prone          []Supine                       []Intermittent   []Continuous Lbs:  [] before manual  [] after manual    []  Ultrasound: []Continuous   [] Pulsed                           []1MHz   []3MHz W/cm2:  Location:    []  Iontophoresis with dexamethasone         Location: [] Take home patch   [] In clinic   10 [x]  Ice     []  heat  []  Ice massage  []  Laser   []  Anodyne Position:seated  Location:right sh    []  Laser with stim  []  Other:  Position:  Location:    []  Vasopneumatic Device    []  Right     []  Left  Pre-treatment girth:  Post-treatment girth:  Measured at (location):  Pressure:       [] lo [] med [] hi   Temperature: [] lo [] med [] hi   [x] Skin assessment post-treatment:  [x]intact []redness- no adverse reaction    []redness - adverse reaction:     31 min Therapeutic Exercise:  [x] See flow sheet :   Rationale: increase ROM and increase strength to improve the patients ability to increase ease with ADLs    8 min Manual Therapy:  [address] joint mobs in supine, ant & post glides and manual stretching   The manual therapy interventions were performed at a separate and distinct time from the therapeutic activities interventions. Rationale: increase ROM and increase tissue extensibility to increase ease with functional tasks    With   [] TE   [] TA   [] neuro   [] other: Patient Education: [x] Review HEP    [] Progressed/Changed HEP based on:   [] positioning   [] body mechanics   [] transfers   [] heat/ice application    [] other:      Other Objective/Functional Measures:   Had no difficulty with exercises  No complaint of increased pain during session      Pain Level (0-10 scale) post treatment: 1/10    ASSESSMENT/Changes in Function:   Pt is progressing well toward goals. Pt cont with decreased strength in the right shoulder. AROM cont to improve. Pt reports no longer having difficulty with ADLs    Patient will continue to benefit from skilled PT services to modify and progress therapeutic interventions, address functional mobility deficits, address ROM deficits, address strength deficits, analyze and address soft tissue restrictions, analyze and cue movement patterns, analyze and modify body mechanics/ergonomics, assess and modify postural abnormalities and instruct in home and community integration to attain remaining goals. []  See Plan of Care  [x]  See progress note/recertification  []  See Discharge Summary         Progress towards goals / Updated goals:  1. Patient will demonstrate AROM right shoulder flex 0-145, scaption 0-120 to increase ease with ADLs. At PN: Progressing; Right shoulder AROM: Flex: 129deg Abduction: 96deg with report of 4/10 pain level in both planes.  (8/31/21)   2. Patient will report average pain level in right shoulder to be no more then 1/10 to increase ease with household management.   At PN: Patient reports average pain level at 2/10 (8/31/21), not met, pt reported average pain of 4/10 with and after activity    PLAN  []  Upgrade activities as tolerated     [x]  Continue plan of care  []  Update interventions per flow sheet       []  Discharge due to:_  []  Other:_      Martha William PTA 9/8/2021  7:14 AM    Future Appointments   Date Time Provider Woodrow Drake   9/8/2021  7:15 AM [facility]   9/10/2021  2:00 PM Lucrezia Manifold, [place] [facility]   9/14/2021  8:00 AM Lucrezia Manifold, PTA [facility]   9/15/2021  7:15 AM Johnanna Cargo, PT [facility]   9/15/2021  8:45 AM Silvino Aiken MD [facility] BS AMB   9/21/2021  7:15 AM Lucrezia Manifold, PTA [facility]   9/22/2021  7:15 AM Johnanna Cargo, PT [facility]   9/28/2021  7:15 AM Lucrezia Manifold, PTA [facility]   9/29/2021  7:15 AM Johnanna Cargo, PT [facility]

## 2021-09-10 ENCOUNTER — HOSPITAL ENCOUNTER (OUTPATIENT)
Dept: PHYSICAL THERAPY | Age: 46
Discharge: HOME | End: 2021-09-10
Attending: ORTHOPAEDIC SURGERY
Payer: OTHER GOVERNMENT

## 2021-09-10 PROCEDURE — 97112 NEUROMUSCULAR REEDUCATION: CPT

## 2021-09-10 PROCEDURE — 97110 THERAPEUTIC EXERCISES: CPT

## 2021-09-10 NOTE — PROGRESS NOTES
PT DAILY TREATMENT NOTE     Patient Name: Brit Lopez  Date:9/10/2021  : 1975  [x]  Patient  Verified  Payor: [facility] / Plan: [facility] CCN / Product Type: Federal Funded Programs /    In time:2:05  Out time:3:00  Total Treatment Time (min): 55  Visit #: 3 of 8      Treatment Area: Pain in right shoulder [M25.511]    SUBJECTIVE  Pain Level (0-10 scale): 0  Any medication changes, allergies to medications, adverse drug reactions, diagnosis change, or new procedure performed?: [x] No    [] Yes (see summary sheet for update)  Subjective functional status/changes:   [] No changes reported  Patient reports she has noticed increased ease with overhead reaching and applying deodorant to opposite shoulder. OBJECTIVE              Modality rationale: decrease pain to improve the patients ability to decrease difficulty while performing.    Min Type Additional Details     10 [x]? ?  Ice     []? ?  heat  []? ?  Ice massage  []? ?  Laser   []? ?  Anodyne Position:sitting  Location:shoulder     []? ? Skin assessment post-treatment:  []??intact []? ?redness- no adverse reaction    []? ?redness - adverse reaction:         25 min Therapeutic Exercise:  - See flow sheet :   Rationale: increase ROM, increase strength and improve coordination to improve the patients ability to increase ease with ADLs.          20 min Neuromuscular Re-education:  -  See flow sheet :empahsis on scapular stability.    Rationale: increase ROM, increase strength, improve coordination, improve balance and increase proprioception  to improve the patients ability to increase ease with overhead reaching.             With   [] TE   [] TA   [] neuro   [] other: Patient Education: [x] Review HEP    [] Progressed/Changed HEP based on:   [] positioning   [] body mechanics   [] transfers   [] heat/ice application    [] other:      Other Objective/Functional Measures: right shoulder AROM flexion 144 deg, scaption 143 deg, functional ER T2, functional IR L1. Pain Level (0-10 scale) post treatment: 2    ASSESSMENT/Changes in Function: Patient is progressing well with right shoulder AROM. Held on manual and focusing on strengthening to increase with shoulder elevation. Patient will continue to benefit from skilled PT services to modify and progress therapeutic interventions, address functional mobility deficits, address ROM deficits, address strength deficits, analyze and address soft tissue restrictions and analyze and cue movement patterns to attain remaining goals. []  See Plan of Care  []  See progress note/recertification  []  See Discharge Summary         Progress towards goals / Updated goals:  1. Patient will demonstrate AROM right shoulder flex 0-145, scaption 0-120 to increase ease with ADLs. At PN: Progressing; Right shoulder AROM: Flex: 129deg Abduction: 96deg with report of 4/10 pain level in both planes. (8/31/21)  Current: MET: right shoulder AROM flexion 144 deg, scaption 143 deg, functional ER T2, functional IR L1. 9/10/21   2. Patient will report average pain level in right shoulder to be no more then 1/10 to increase ease with household management.   At PN: Patient reports average pain level at 2/10 (8/31/21),   Current: not met, pt reported average pain of 4/10 with and after activity    PLAN  []  Upgrade activities as tolerated     [x]  Continue plan of care  []  Update interventions per flow sheet       []  Discharge due to:_  []  Other:_      Dc Mott PTA 9/10/2021  2:08 PM    Future Appointments   Date Time Provider Woodrow Drake   9/14/2021  8:00 AM Brent Bliss PTA [facility]   9/15/2021  7:15 AM Simon Balderas, PT [facility]   9/15/2021  8:45 AM MD NED Luna BS AMB   9/21/2021  7:15 AM Brent Bliss PTA [facility]   9/22/2021  7:15 AM Simon Balderas, PT [facility]   9/28/2021  7:15 AM Brent Bliss PTA [facility]   9/29/2021  7:15 AM Simon Balderas, PT [facility]

## 2021-09-14 ENCOUNTER — HOSPITAL ENCOUNTER (OUTPATIENT)
Dept: PHYSICAL THERAPY | Age: 46
Discharge: HOME | End: 2021-09-14
Attending: ORTHOPAEDIC SURGERY
Payer: OTHER GOVERNMENT

## 2021-09-14 PROCEDURE — 97110 THERAPEUTIC EXERCISES: CPT

## 2021-09-14 PROCEDURE — 97112 NEUROMUSCULAR REEDUCATION: CPT

## 2021-09-14 NOTE — PROGRESS NOTES
PT DAILY TREATMENT NOTE     Patient Name: Remi Jackman  Date:2021  : 1975  [x]  Patient  Verified  Payor: [facility] / Plan: [facility] CCN / Product Type: Federal Funded Programs /    In time:8:09  Out time:8:40  Total Treatment Time (min): 31  Visit #: 4 of 8    Treatment Area: Pain in right shoulder [M25.511]    SUBJECTIVE  Pain Level (0-10 scale): 0  Any medication changes, allergies to medications, adverse drug reactions, diagnosis change, or new procedure performed?: [x] No    [] Yes (see summary sheet for update)  Subjective functional status/changes:   [] No changes reported  Patient reports being sore after last therapy session. OBJECTIVE        23 min Therapeutic Exercise:  - See flow sheet :   Rationale: increase ROM, increase strength and improve coordination to improve the patients ability to increase ease with ADLs.          8 min Neuromuscular Re-education:  -  See flow sheet :empahsis on scapular stability.    Rationale: increase ROM, increase strength, improve coordination, improve balance and increase proprioception  to improve the patients ability to increase ease with overhead reaching.            With   [] TE   [] TA   [] neuro   [] other: Patient Education: [x] Review HEP    [] Progressed/Changed HEP based on:   [] positioning   [] body mechanics   [] transfers   [] heat/ice application    [] other:      Other Objective/Functional Measures: pt reported average pain of 4/10 with and after activity      Pain Level (0-10 scale) post treatment: 2    ASSESSMENT/Changes in Function: Able to progress with increase in pain. Patient continues to be limited with shoulder elevation with weights without performing upper trap substitution.         Patient will continue to benefit from skilled PT services to modify and progress therapeutic interventions, address functional mobility deficits, address ROM deficits, address strength deficits, analyze and address soft tissue restrictions and analyze and cue movement patterns to attain remaining goals. []  See Plan of Care  []  See progress note/recertification  []  See Discharge Summary         Progress towards goals / Updated goals:  1. Patient will demonstrate AROM right shoulder flex 0-145, scaption 0-120 to increase ease with ADLs. At PN: Progressing; Right shoulder AROM: Flex: 129deg Abduction: 96deg with report of 4/10 pain level in both planes. (8/31/21)  Current: MET: right shoulder AROM flexion 144 deg, scaption 143 deg, functional ER T2, functional IR L1. 9/10/21   2. Patient will report average pain level in right shoulder to be no more then 1/10 to increase ease with household management.   At PN: Patient reports average pain level at 2/10 (8/31/21),   Current: not met, pt reported average pain of 4/10 with and after activity 9/14/21       PLAN  []  Upgrade activities as tolerated     [x]  Continue plan of care  []  Update interventions per flow sheet       []  Discharge due to:_  []  Other:_      Nidia Wild PTA 9/14/2021  8:12 AM    Future Appointments   Date Time Provider Woodrow Drake   9/15/2021  7:15 AM Eugenio Donald, PT [facility]   9/15/2021  8:45 AM MD NED Mcclellan BS AMB   9/21/2021  7:15 AM Darnell Ba, PTA [facility]   9/22/2021  7:15 AM Eugenio Cargo, PT [facility]   9/28/2021  7:15 AM Darnell Ba, PTA [facility]   9/29/2021  7:15 AM Eugenio Cargo, PT [facility]

## 2021-09-15 ENCOUNTER — HOSPITAL ENCOUNTER (OUTPATIENT)
Dept: PHYSICAL THERAPY | Age: 46
Discharge: HOME | End: 2021-09-15
Attending: ORTHOPAEDIC SURGERY
Payer: OTHER GOVERNMENT

## 2021-09-15 ENCOUNTER — OFFICE VISIT (OUTPATIENT)
Dept: ORTHOPEDIC SURGERY | Age: 46
End: 2021-09-15
Payer: OTHER GOVERNMENT

## 2021-09-15 VITALS
WEIGHT: 181.8 LBS | OXYGEN SATURATION: 100 % | TEMPERATURE: 97.5 F | BODY MASS INDEX: 28.53 KG/M2 | HEART RATE: 68 BPM | HEIGHT: 67 IN

## 2021-09-15 DIAGNOSIS — Z98.890 STATUS POST ARTHROSCOPY OF RIGHT SHOULDER: ICD-10-CM

## 2021-09-15 DIAGNOSIS — M75.111 NONTRAUMATIC INCOMPLETE TEAR OF RIGHT ROTATOR CUFF: Primary | ICD-10-CM

## 2021-09-15 PROCEDURE — 97110 THERAPEUTIC EXERCISES: CPT | Performed by: PHYSICAL THERAPIST

## 2021-09-15 PROCEDURE — 97112 NEUROMUSCULAR REEDUCATION: CPT | Performed by: PHYSICAL THERAPIST

## 2021-09-15 PROCEDURE — 97140 MANUAL THERAPY 1/> REGIONS: CPT | Performed by: PHYSICAL THERAPIST

## 2021-09-15 PROCEDURE — 99213 OFFICE O/P EST LOW 20 MIN: CPT | Performed by: ORTHOPAEDIC SURGERY

## 2021-09-15 NOTE — PROGRESS NOTES
Patient: Mandy Maciel                MRN: [id number]       SSN: [id number]  YOB: 1975        AGE: 48 y.o. SEX: female  Body mass index is 28.47 kg/m². PCP: Alice Wong Not On File, MD  09/15/21    Chief Complaint: Right shoulder follow up    Pain 0/10    HPI: Mandy Maciel is a 48 y.o. female patient who returns to the office today for her right shoulder. She is now 5 months out from her right shoulder arthroscopic rotator cuff repair. Overall she is doing well. She is still in physical therapy and due to completed later this month. Past Medical History:   Diagnosis Date    Anxiety     Anxiety     Depression     Insomnia     Migraine     PTSD (post-traumatic stress disorder)        Family History   Problem Relation Age of Onset    Cancer Sister         panreatic    Diabetes Mother     Heart Disease Mother        Current Outpatient Medications   Medication Sig Dispense Refill    diclofenac (Voltaren) 1 % gel Apply 4 g to affected area four (4) times daily. 100 g 3    calcium citrate 200 mg (950 mg) tablet Take  by mouth daily.  FERROUS SULFATE PO Take 1 Tab by mouth two (2) times a day.  ibuprofen (MOTRIN) 600 mg tablet Take 1 tablet by mouth every six (6) hours as needed for Pain. 20 tablet 0    traZODone (DESYREL) 100 mg tablet Take 100 mg by mouth nightly.          Allergies   Allergen Reactions    Pcn [Penicillins] Angioedema       Past Surgical History:   Procedure Laterality Date    HX BREAST BIOPSY      left breast    ME ANESTH,SURGERY OF SHOULDER      torn rotator cuff and ligament repair       Social History     Socioeconomic History    Marital status:      Spouse name: Not on file    Number of children: Not on file    Years of education: Not on file    Highest education level: Not on file   Occupational History    Not on file   Tobacco Use    Smoking status: Never Smoker    Smokeless tobacco: Never Used   Substance and Sexual Activity    Alcohol use: No    Drug use: No    Sexual activity: Not on file   Other Topics Concern    Not on file   Social History Narrative    Not on file     Social Determinants of Health     Financial Resource Strain:     Difficulty of Paying Living Expenses:    Food Insecurity:     Worried About Running Out of Food in the Last Year:     [address] in the Last Year:    Transportation Needs:     Lack of Transportation (Medical):  Lack of Transportation (Non-Medical):    Physical Activity:     Days of Exercise per Week:     Minutes of Exercise per Session:    Stress:     Feeling of Stress :    Social Connections:     Frequency of Communication with Friends and Family:     Frequency of Social Gatherings with Friends and Family:     Attends [employer] Services:     Active Member of Clubs or Organizations:     Attends Club or Organization Meetings:     Marital Status:    Intimate Partner Violence:     Fear of Current or Ex-Partner:     Emotionally Abused:     Physically Abused:     Sexually Abused:        REVIEW OF SYSTEMS:      No changes from previous review of systems unless noted. PHYSICAL EXAMINATION:  Visit Vitals  Pulse 68   Temp 97.5 °F (36.4 °C) (Skin)   Ht 5' 7\" (1.702 m)   Wt 181 lb 12.8 oz (82.5 kg)   SpO2 100%   BMI 28.47 kg/m²     Body mass index is 28.47 kg/m². GENERAL: Alert and oriented x3, in no acute distress. HEENT: Normocephalic, atraumatic. RESP: Non labored breathing. SKIN: No rashes or lesions noted.    Shoulder Examination     R   L  ROM   FF  Full   Full  ER  Full   Full   IR  LS   Full  Rotator Cuff Pain   Supra  -   -   Infra  -   -   Subscap -   -  Crepitus  -   -  Effusion  -   -  Warmth  -   -   Erythema  -   -  Instability  -   -  AC Joint TTP  -   -  Clavicle   Deformity -   -   TTP  -   -  Proximal Humerus   Deformity -   -   TTP  -   -  Deltoid Strength 5   5  Biceps Strength 5   5  Biceps Deformity -   -  Biceps Groove Pain -   -  Impingement Sign -   -       IMAGING:  No imaging today    ASSESSMENT & PLAN  Diagnosis: Status post right shoulder arthroscopic rotator cuff repair    Kentrell Fernandez is doing well 5 months out from her rotator cuff repair. She can finish physical therapy and transition to home program.  I will see her back in 3 months for likely final visit for her right shoulder. Electronically signed by: Andrea Wiseman MD    Note: This note was completed using voice recognition software.   Any typographical/name errors or mistakes are unintentional.

## 2021-09-15 NOTE — PROGRESS NOTES
PT DAILY TREATMENT NOTE     Patient Name: Fabienne Garcia  Date:9/15/2021  : 1975  [x]  Patient  Verified  Payor: [facility] CCN / Plan: [facility] CCN / Product Type: Federal Funded Programs /    In time:7:20  Out time:8:13  Total Treatment Time (min): 48  Visit #: 5 of 8    Treatment Area: Pain in right shoulder [M25.511]    SUBJECTIVE  Pain Level (0-10 scale): 0  Any medication changes, allergies to medications, adverse drug reactions, diagnosis change, or new procedure performed?: [x] No    [] Yes (see summary sheet for update)  Subjective functional status/changes:   [] No changes reported  Patient with CC of weakness in the right shoulder. She notes functionally she is doing okay at work and at home. She goes to the gym 3 days per week and doing her HEP. OBJECTIVE    Modality rationale: decrease inflammation and decrease pain to improve the patients ability to increase tolerance to activity.    Min Type Additional Details    [] Estim:  []Unatt       []IFC  []Premod                        []Other:  []w/ice   []w/heat  Position:  Location:    [] Estim: []Att    []TENS instruct  []NMES                    []Other:  []w/US   []w/ice   []w/heat  Position:  Location:    []  Traction: [] Cervical       []Lumbar                       [] Prone          []Supine                       []Intermittent   []Continuous Lbs:  [] before manual  [] after manual    []  Ultrasound: []Continuous   [] Pulsed                           []1MHz   []3MHz W/cm2:  Location:    []  Iontophoresis with dexamethasone         Location: [] Take home patch   [] In clinic   10 [x]  Ice     []  heat  []  Ice massage  []  Laser   []  Anodyne Position:sitting  Location: right shoulder    []  Laser with stim  []  Other:  Position:  Location:    []  Vasopneumatic Device    []  Right     []  Left  Pre-treatment girth:  Post-treatment girth:  Measured at (location):  Pressure:       [] lo [] med [] hi   Temperature: [] lo [] med [] hi   [] Skin assessment post-treatment:  []intact []redness- no adverse reaction    []redness - adverse reaction:       23 min Therapeutic Exercise:  [] See flow sheet :   Rationale: increase ROM and increase strength to improve the patients ability to increase ease with functional activities. 12 min Neuromuscular Re-education:  []  See flow sheet :   Rationale: increase strength, improve coordination and increase proprioception  to improve the patients ability to increase ease with functional lifting and reaching. 8 min Manual Therapy:  GH joint mobs, supine, grade II-III-IV anterior and posterior glides to facilitate increased ER. The manual therapy interventions were performed at a separate and distinct time from the therapeutic activities interventions. Rationale: increase ROM and increase tissue extensibility to increase ease of motion to improve function. With   [] TE   [] TA   [] neuro   [] other: Patient Education: [x] Review HEP    [] Progressed/Changed HEP based on:   [] positioning   [] body mechanics   [] transfers   [] heat/ice application    [] other:      Other Objective/Functional Measures: Tightness noted in the right shoulder capsule with ER > IR. Elevation is WFL. Pain Level (0-10 scale) post treatment: 1    ASSESSMENT/Changes in Function: Patient with good function and minimal pain noted. She notes some functional weakness. She is almost 5 months post op right RTC repair. Notes soreness following her session today. Patient will continue to benefit from skilled PT services to modify and progress therapeutic interventions, address functional mobility deficits, address ROM deficits, address strength deficits, analyze and address soft tissue restrictions and analyze and cue movement patterns to attain remaining goals.      [x]  See Plan of Care  []  See progress note/recertification  []  See Discharge Summary         Progress towards goals / Updated goals: 1. Patient will demonstrate AROM right shoulder flex 0-145, scaption 0-120 to increase ease with ADLs. At PN: Progressing; Right shoulder AROM: Flex: 129deg Abduction: 96deg with report of 4/10 pain level in both planes. (8/31/21)  Current: MET: right shoulder AROM flexion 144 deg, scaption 143 deg, functional ER T2, functional IR L1. 9/10/21   2. Patient will report average pain level in right shoulder to be no more then 1/10 to increase ease with household management.   At PN: Patient reports average pain level at 2/10 (8/31/21),   Current: not met, pt reported average pain of 4/10 with and after activity 9/14/21    PLAN  []  Upgrade activities as tolerated     []  Continue plan of care  []  Update interventions per flow sheet       []  Discharge due to:_  []  Other:_      Ivy Whitaker PT 9/15/2021  7:32 AM    Future Appointments   Date Time Provider Woodrow Drake   9/15/2021  8:45 AM MD NED Jose BS AMB   9/21/2021  7:15 AM Cathhardik Leo PTA [facility]   9/22/2021  7:15 AM Joanne Mcgee PT [facility]   9/28/2021  7:15 AM Faith Leo PTA [facility]   9/29/2021  7:15 AM Joanne Mcgee PT [facility]

## 2021-09-21 ENCOUNTER — HOSPITAL ENCOUNTER (OUTPATIENT)
Dept: PHYSICAL THERAPY | Age: 46
Discharge: HOME | End: 2021-09-21
Attending: ORTHOPAEDIC SURGERY
Payer: OTHER GOVERNMENT

## 2021-09-21 PROCEDURE — 97112 NEUROMUSCULAR REEDUCATION: CPT

## 2021-09-21 PROCEDURE — 97110 THERAPEUTIC EXERCISES: CPT

## 2021-09-21 NOTE — PROGRESS NOTES
PT DAILY TREATMENT NOTE     Patient Name: Sonal Parker  Date:2021  : 1975  [x]  Patient  Verified  Payor: Layne Guzman / Plan: [id number] / Product Type: Federal Funded Programs /    In time:7:30  Out time:8:13  Total Treatment Time (min): 43  Visit #: 6 of 8    Treatment Area: Pain in right shoulder [M25.511]    SUBJECTIVE  Pain Level (0-10 scale): 1  Any medication changes, allergies to medications, adverse drug reactions, diagnosis change, or new procedure performed?: [x] No    [] Yes (see summary sheet for update)  Subjective functional status/changes:   [] No changes reported  Patient states that the doctor was pleased with her shoulder and she can finish up therapy after her scheduled visits. OBJECTIVE    Modality rationale: decrease pain to improve the patients ability to decrease difficulty while performing tasks. Min Type Additional Details   10 [x]  Ice     []  heat  []  Ice massage  []  Laser   []  Anodyne Position:sitting  Location:shoulder   [] Skin assessment post-treatment:  []intact []redness- no adverse reaction    []redness - adverse reaction:       23 min Therapeutic Exercise:  [x] See flow sheet :   Rationale: increase ROM, increase strength, improve coordination and increase proprioception to improve the patients ability to increase ease with lifting and carrying objects. 10 min Neuromuscular Re-education:  [x]  See flow sheet :emphasis on scapular stability    Rationale: increase ROM, increase strength, improve coordination and increase proprioception  to improve the patients ability to increase ease with overhead reaching.          With   [] TE   [] TA   [] neuro   [] other: Patient Education: [x] Review HEP    [] Progressed/Changed HEP based on:   [] positioning   [] body mechanics   [] transfers   [] heat/ice application    [] other:      Other Objective/Functional Measures:  pt reported average pain of 4/10 with and after activity     Pain Level (0-10 scale) post treatment: 1    ASSESSMENT/Changes in Function: Discussed with patient about Dc after finishing the last three scheduled visits. Patient will continue to benefit from skilled PT services to modify and progress therapeutic interventions, address functional mobility deficits, address ROM deficits, address strength deficits, analyze and address soft tissue restrictions and analyze and cue movement patterns to attain remaining goals. []  See Plan of Care  []  See progress note/recertification  []  See Discharge Summary         Progress towards goals / Updated goals:  1. Patient will demonstrate AROM right shoulder flex 0-145, scaption 0-120 to increase ease with ADLs. At PN: Progressing; Right shoulder AROM: Flex: 129deg Abduction: 96deg with report of 4/10 pain level in both planes. (8/31/21)  Current: MET: right shoulder AROM flexion 144 deg, scaption 143 deg, functional ER T2, functional IR L1. 9/10/21   2. Patient will report average pain level in right shoulder to be no more then 1/10 to increase ease with household management.   At PN: Patient reports average pain level at 2/10 (8/31/21),   Current: not met, pt reported average pain of 4/10 with and after activity 9/21/21       PLAN  []  Upgrade activities as tolerated     [x]  Continue plan of care  []  Update interventions per flow sheet       []  Discharge due to:_  []  Other:_      Bar Dalton PTA 9/21/2021  7:28 AM    Future Appointments   Date Time Provider Woodrow Drake   9/22/2021  7:15 AM Ellen Wyman, PT [facility]   9/28/2021  7:15 AM Yue Van PTA [facility]   9/29/2021  7:15 AM Ellen Wyman PT [facility]   12/15/2021  8:45 AM Maria Elena Jama MD [facility] AM[facility]

## 2021-09-22 ENCOUNTER — HOSPITAL ENCOUNTER (OUTPATIENT)
Dept: PHYSICAL THERAPY | Age: 46
Discharge: HOME | End: 2021-09-22
Attending: ORTHOPAEDIC SURGERY
Payer: OTHER GOVERNMENT

## 2021-09-22 PROCEDURE — 97112 NEUROMUSCULAR REEDUCATION: CPT | Performed by: PHYSICAL THERAPIST

## 2021-09-22 PROCEDURE — 97140 MANUAL THERAPY 1/> REGIONS: CPT | Performed by: PHYSICAL THERAPIST

## 2021-09-22 PROCEDURE — 97110 THERAPEUTIC EXERCISES: CPT | Performed by: PHYSICAL THERAPIST

## 2021-09-22 NOTE — PROGRESS NOTES
PT DAILY TREATMENT NOTE     Patient Name: Geovanna Feldman  Date:2021  : 1975  [x]  Patient  Verified  Payor: [facility] CCN / Plan: [facility] CCN / Product Type: Federal Funded Programs /    In time:7:18  Out time:8:13  Total Treatment Time (min): 55  Visit #: 7 of 8    Treatment Area: Pain in right shoulder [M25.511]    SUBJECTIVE  Pain Level (0-10 scale): 1  Any medication changes, allergies to medications, adverse drug reactions, diagnosis change, or new procedure performed?: [x] No    [] Yes (see summary sheet for update)  Subjective functional status/changes:   [] No changes reported  Patient reports a little stiffness this morning, states she may have slept on her right shoulder last night. OBJECTIVE    Modality rationale: decrease inflammation and decrease pain to improve the patients ability to increase tolerance to activity.    Min Type Additional Details    [] Estim:  []Unatt       []IFC  []Premod                        []Other:  []w/ice   []w/heat  Position:  Location:    [] Estim: []Att    []TENS instruct  []NMES                    []Other:  []w/US   []w/ice   []w/heat  Position:  Location:    []  Traction: [] Cervical       []Lumbar                       [] Prone          []Supine                       []Intermittent   []Continuous Lbs:  [] before manual  [] after manual    []  Ultrasound: []Continuous   [] Pulsed                           []1MHz   []3MHz W/cm2:  Location:    []  Iontophoresis with dexamethasone         Location: [] Take home patch   [] In clinic   10 [x]  Ice     []  heat  []  Ice massage  []  Laser   []  Anodyne Position:sitting  Location:right shoulder    []  Laser with stim  []  Other:  Position:  Location:    []  Vasopneumatic Device    []  Right     []  Left  Pre-treatment girth:  Post-treatment girth:  Measured at (location):  Pressure:       [] lo [] med [] hi   Temperature: [] lo [] med [] hi   [] Skin assessment post-treatment: []intact []redness- no adverse reaction    []redness - adverse reaction:     25 min Therapeutic Exercise:  [] See flow sheet :   Rationale: increase ROM and increase strength to improve the patients ability to increase ease with functional activities. 12 min Neuromuscular Re-education:  []  See flow sheet :   Rationale: increase strength, improve coordination and increase proprioception  to improve the patients ability to increase ease with functional lifting. 8 min Manual Therapy:  GH joint mobs, supine, grade II-III-IV anterior and posterior glides to facilitate increased ER. The manual therapy interventions were performed at a separate and distinct time from the therapeutic activities interventions. Rationale: increase ROM and increase tissue extensibility to increase ease of motion to improve function. With   [] TE   [] TA   [] neuro   [] other: Patient Education: [x] Review HEP    [] Progressed/Changed HEP based on:   [] positioning   [] body mechanics   [] transfers   [] heat/ice application    [] other:      Other Objective/Functional Measures: Continues to exhibit tightness with IR and ER but more springy at this point with improving motion. Pain Level (0-10 scale) post treatment: 1    ASSESSMENT/Changes in Function: Patient with decreased pain and improving motion into rotation of the right shoulder. Patient will continue to benefit from skilled PT services to modify and progress therapeutic interventions, address functional mobility deficits, address ROM deficits, address strength deficits, analyze and address soft tissue restrictions and analyze and cue movement patterns to attain remaining goals. [x]  See Plan of Care  []  See progress note/recertification  []  See Discharge Summary         Progress towards goals / Updated goals:  1. Patient will demonstrate AROM right shoulder flex 0-145, scaption 0-120 to increase ease with ADLs.   At PN: Progressing; Right shoulder AROM: Flex: 129deg Abduction: 96deg with report of 4/10 pain level in both planes. (8/31/21)  Current: MET: right shoulder AROM flexion 144 deg, scaption 143 deg, functional ER T2, functional IR L1. 9/10/21   2. Patient will report average pain level in right shoulder to be no more then 1/10 to increase ease with household management. At PN: Patient reports average pain level at 2/10 (8/31/21),   Current: Pt reported average pain of 1/10.  9/21/2021. Goal Met.     PLAN  [x]  Upgrade activities as tolerated     [x]  Continue plan of care  []  Update interventions per flow sheet       []  Discharge due to:_  []  Other:_      Shiraz Sullivan PT 9/22/2021  7:25 AM    Future Appointments   Date Time Provider Woodrow Drake   9/28/2021  7:15 AM Elias Harris PTA [facility]   12/15/2021  8:45 AM Alexus Fung MD [facility] AM[facility]

## 2021-09-28 ENCOUNTER — APPOINTMENT (OUTPATIENT)
Dept: PHYSICAL THERAPY | Age: 46
End: 2021-09-28
Attending: ORTHOPAEDIC SURGERY
Payer: OTHER GOVERNMENT

## 2021-09-29 ENCOUNTER — APPOINTMENT (OUTPATIENT)
Dept: PHYSICAL THERAPY | Age: 46
End: 2021-09-29
Attending: ORTHOPAEDIC SURGERY
Payer: OTHER GOVERNMENT

## 2021-10-01 ENCOUNTER — HOSPITAL ENCOUNTER (OUTPATIENT)
Dept: PHYSICAL THERAPY | Age: 46
Discharge: HOME | End: 2021-10-01
Attending: ORTHOPAEDIC SURGERY
Payer: OTHER GOVERNMENT

## 2021-10-01 PROCEDURE — 97110 THERAPEUTIC EXERCISES: CPT

## 2021-10-01 PROCEDURE — 97112 NEUROMUSCULAR REEDUCATION: CPT

## 2021-10-01 NOTE — PROGRESS NOTES
[facility]              [address]        [place], [address] [place]   [phone] fax    Discharge Summary  Patient name: Ivana Dunbar Start of Care: 2021   Referral source: [place], [place] : 1975   Medical/Treatment Diagnosis: Pain in right shoulder [M25.511]  Payor: [facility] / Plan: Ayanaia Tang / Product Type: [employer] Programs /  Onset Date:DoS 2021     Prior Hospitalization: see medical history Provider#: [id number]   Medications: Verified on Patient Summary List    Comorbidities: Allergies, Anxiety or Panic Disorders, Back pain, Depression, Headaches, Prior  Surgery, Prosthesis / Implants, Sleep dysfunction  Prior Level of Function: She had limited ROM and painful right shoulder prior to surgery.  Independent self care. Visits from Start of Care: 24    Missed Visits:3  Reporting Period : 21 to 10/1/21    Summary of Care:  1. Patient will demonstrate AROM right shoulder flex 0-145, scaption 0-120 to increase ease with ADLs. At PN: Progressing; Right shoulder AROM: Flex: 129deg Abduction: 96deg with report of 4/10 pain level in both planes. Current: MET: right shoulder AROM flexion 144 deg, scaption 143 deg, functional ER T2, functional IR L1.   2. Patient will report average pain level in right shoulder to be no more then 1/10 to increase ease with household management. At PN: Patient reports average pain level at 2/10 ,   Current: Pt reported average pain of 1/10.  Goal Met.       ASSESSMENT/RECOMMENDATIONS:  Patient is able to perform activities of daily living. Patient reports she continues to have fatigue with her shoulder.  Patient has been provided with updated HEP to continue with shoulder strengthening.        [x]Discontinue therapy: [x]Patient has reached or is progressing toward set goals      []Patient is non-compliant or has abdicated      []Due to lack of appreciable progress towards set goals    Leticia Mares PTA 10/1/2021 2:28 PM

## 2021-10-01 NOTE — PROGRESS NOTES
PT DAILY TREATMENT NOTE     Patient Name: Andrea Cardozo  Date:10/1/2021  : 1975  [x]  Patient  Verified  Payor: [facility] / Plan: [facility] CCN / Product Type: Federal Funded Programs /    In time:2:00  Out time:2:39  Total Treatment Time (min): 39  Visit #: 6 of 8    Treatment Area: Pain in right shoulder [M25.511]    SUBJECTIVE  Pain Level (0-10 scale):0  Any medication changes, allergies to medications, adverse drug reactions, diagnosis change, or new procedure performed?: [x] No    [] Yes (see summary sheet for update)  Subjective functional status/changes:   [] No changes reported  Patient reports she is able to use her shoulder with perform task, but she does notice that it gets tired fast.     OBJECTIVE       23 min Therapeutic Exercise:  [x]? See flow sheet :   Rationale: increase ROM, increase strength, improve coordination and increase proprioception to improve the patients ability to increase ease with lifting and carrying objects.       16 min Neuromuscular Re-education:  [x]? See flow sheet :emphasis on scapular stability    Rationale: increase ROM, increase strength, improve coordination and increase proprioception  to improve the patients ability to increase ease with overhead reaching. With   [] TE   [] TA   [] neuro   [] other: Patient Education: [x] Review HEP    [] Progressed/Changed HEP based on:   [] positioning   [] body mechanics   [] transfers   [] heat/ice application    [] other:      Other Objective/Functional Measures: see goal     Pain Level (0-10 scale) post treatment: 0    ASSESSMENT/Changes in Function: Patient is able to perform activities of daily living. Patient reports she continues to have fatigue with her shoulder. Patient has been provided with updated HEP to continue with shoulder strengthening.           []  See Plan of Care  []  See progress note/recertification  [x]  See Discharge Summary         Progress towards goals / Updated goals:  1. Patient will demonstrate AROM right shoulder flex 0-145, scaption 0-120 to increase ease with ADLs. At PN: Progressing; Right shoulder AROM: Flex: 129deg Abduction: 96deg with report of 4/10 pain level in both planes. (8/31/21)  Current: MET: right shoulder AROM flexion 144 deg, scaption 143 deg, functional ER T2, functional IR L1. 9/10/21   2. Patient will report average pain level in right shoulder to be no more then 1/10 to increase ease with household management. At PN: Patient reports average pain level at 2/10 (8/31/21),   Current: Pt reported average pain of 1/10.  9/22/2021. Goal Met. PLAN  []  Upgrade activities as tolerated     []  Continue plan of care  []  Update interventions per flow sheet       [x]  Discharge due to:_Patient met LTGs.    []  Other:_      Ana Murillo PTA 10/1/2021  1:52 PM    Future Appointments   Date Time Provider Woodrow Drake   10/1/2021  2:00 PM Willim Closs [facility]   12/15/2021  8:45 AM Marisela Fung MD [facility]

## 2021-10-01 NOTE — PROGRESS NOTES
Physical Therapy Discharge Instructions      [facility]   [address], [address] [place]   [phone] fax    Patient: Lamar Mcgill  : 1975      Continue Home Exercise Program 1 times per day for 4 weeks, then decrease to 3 times per week      Continue with    [x] Ice  as needed      [x] Heat           Follow up with MD:     [] Upon completion of therapy     [x] As needed      Recommendations:     [x]   Return to activity with home program    []   Return to activity with the following modifications:       []Post Rehab Program    []Join Independent aquatic program     []Return to/join local gym      Devin Giang PTA 10/1/2021 2:39 PM

## 2022-01-12 ENCOUNTER — OFFICE VISIT (OUTPATIENT)
Dept: ORTHOPEDIC SURGERY | Age: 47
End: 2022-01-12
Payer: OTHER GOVERNMENT

## 2022-01-12 VITALS
BODY MASS INDEX: 29.82 KG/M2 | HEIGHT: 67 IN | OXYGEN SATURATION: 98 % | WEIGHT: 190 LBS | HEART RATE: 75 BPM | TEMPERATURE: 96.9 F

## 2022-01-12 DIAGNOSIS — M25.512 CHRONIC LEFT SHOULDER PAIN: ICD-10-CM

## 2022-01-12 DIAGNOSIS — M75.112 NONTRAUMATIC INCOMPLETE TEAR OF LEFT ROTATOR CUFF: ICD-10-CM

## 2022-01-12 DIAGNOSIS — G89.29 CHRONIC LEFT SHOULDER PAIN: ICD-10-CM

## 2022-01-12 DIAGNOSIS — M75.112 NONTRAUMATIC INCOMPLETE TEAR OF LEFT ROTATOR CUFF: Primary | ICD-10-CM

## 2022-01-12 DIAGNOSIS — M75.111 NONTRAUMATIC INCOMPLETE TEAR OF RIGHT ROTATOR CUFF: ICD-10-CM

## 2022-01-12 DIAGNOSIS — G56.02 LEFT CARPAL TUNNEL SYNDROME: ICD-10-CM

## 2022-01-12 PROCEDURE — 73030 X-RAY EXAM OF SHOULDER: CPT | Performed by: ORTHOPAEDIC SURGERY

## 2022-01-12 PROCEDURE — 99214 OFFICE O/P EST MOD 30 MIN: CPT | Performed by: ORTHOPAEDIC SURGERY

## 2022-01-12 RX ORDER — QUETIAPINE FUMARATE 100 MG/1
TABLET, FILM COATED ORAL
COMMUNITY
Start: 2021-09-22 | End: 2022-09-23

## 2022-01-12 RX ORDER — CHOLECALCIFEROL (VITAMIN D3) 125 MCG
5 CAPSULE ORAL
COMMUNITY

## 2022-01-12 NOTE — PROGRESS NOTES
Patient: Teresa Stauffer                MRN: [id number]       SSN: [id number]  YOB: 1975        AGE: 50 y.o. SEX: female  Body mass index is 29.76 kg/m². PCP: Iban Sears Not On File, MD  01/12/22    Chief Complaint: Left shoulder pain, right shoulder follow up, left hand numbness/tingling    HPI: Teresa Stauffer is a 50 y.o. female patient who returns to the office today for her right and left shoulders. The right shoulder is now about 9 months out from her rotator cuff repair and she still does have some pain and weakness. She is no longer in physical therapy for this. She also was continued to have some left shoulder pain that she rates as a 3 out of 10. The pain is worse with overhead activities. She is also noted since Thanksgiving some numbness and tingling in the median nerve distribution of the left hand. No trauma or injury. Past Medical History:   Diagnosis Date    Anxiety     Anxiety     Depression     Insomnia     Migraine     PTSD (post-traumatic stress disorder)        Family History   Problem Relation Age of Onset    Cancer Sister         panreatic    Diabetes Mother     Heart Disease Mother        Current Outpatient Medications   Medication Sig Dispense Refill    QUEtiapine (SEROquel) 100 mg tablet TAKE ONE-HALF TABLET BY MOUTH AT BEDTIME FOR MOOD AND SLEEP      melatonin 5 mg tablet Take 5 mg by mouth nightly as needed.  diclofenac (Voltaren) 1 % gel Apply 4 g to affected area four (4) times daily. 100 g 3    calcium citrate 200 mg (950 mg) tablet Take  by mouth daily.  FERROUS SULFATE PO Take 1 Tab by mouth two (2) times a day.  ibuprofen (MOTRIN) 600 mg tablet Take 1 tablet by mouth every six (6) hours as needed for Pain. 20 tablet 0    traZODone (DESYREL) 100 mg tablet Take 100 mg by mouth nightly.  (Patient not taking: Reported on 1/12/2022)         Allergies   Allergen Reactions    Pcn [Penicillins] Angioedema       Past Surgical History:   Procedure Laterality Date    HX BREAST BIOPSY      left breast    ME ANESTH,SURGERY OF SHOULDER      torn rotator cuff and ligament repair       Social History     Socioeconomic History    Marital status:      Spouse name: Not on file    Number of children: Not on file    Years of education: Not on file    Highest education level: Not on file   Occupational History    Not on file   Tobacco Use    Smoking status: Never Smoker    Smokeless tobacco: Never Used   Substance and Sexual Activity    Alcohol use: No    Drug use: No    Sexual activity: Not on file   Other Topics Concern    Not on file   Social History Narrative    Not on file     Social Determinants of Health     Financial Resource Strain:     Difficulty of Paying Living Expenses: Not on file   Food Insecurity:     Worried About Running Out of Food in the Last Year: Not on file    Ashley of Food in the Last Year: Not on file   Transportation Needs:     Lack of Transportation (Medical): Not on file    Lack of Transportation (Non-Medical):  Not on file   Physical Activity:     Days of Exercise per Week: Not on file    Minutes of Exercise per Session: Not on file   Stress:     Feeling of Stress : Not on file   Social Connections:     Frequency of Communication with Friends and Family: Not on file    Frequency of Social Gatherings with Friends and Family: Not on file    Attends Faith Services: Not on file    Active Member of [address] [place] [address] [employer] or Organizations: Not on file    Attends Club or Organization Meetings: Not on file    Marital Status: Not on file   Intimate Partner Violence:     Fear of Current or Ex-Partner: Not on file    Emotionally Abused: Not on file    Physically Abused: Not on file    Sexually Abused: Not on file   Housing Stability:     Unable to Pay for Housing in the Last Year: Not on file    Number of [place] in the Last Year: Not on file    Unstable Housing in the Last Year: Not on file       REVIEW OF SYSTEMS:      No changes from previous review of systems unless noted. PHYSICAL EXAMINATION:  Visit Vitals  Pulse 75   Temp 96.9 °F (36.1 °C) (Temporal)   Ht 5' 7\" (1.702 m)   Wt 190 lb (86.2 kg)   SpO2 98%   BMI 29.76 kg/m²     Body mass index is 29.76 kg/m². GENERAL: Alert and oriented x3, in no acute distress. HEENT: Normocephalic, atraumatic. RESP: Non labored breathing. SKIN: No rashes or lesions noted. Shoulder Examination     R   L  ROM   FF  Full   Full  ER  Full   Full   IR  Full   Full  Rotator Cuff Pain   Supra  +   +   Infra  -   -   Subscap -   -  Crepitus  -   -  Effusion  -   -  Warmth  -   -   Erythema  -   -  Instability  -   -  AC Joint TTP  -   -  Clavicle   Deformity -   -   TTP  -   -  Proximal Humerus   Deformity -   -   TTP  -   -  Deltoid Strength 5   5  Biceps Strength 5   5  Biceps Deformity -   -  Biceps Groove Pain -   +  Impingement Sign -   -     Hand Examination  R   L  Wrist ROM   Full   Full  Finger ROM   Full   Full  Deformity   -   -  Swelling   -   -  Erythema   -   -  Triggering   -   -  Carpal Tunnel    Tinels   -   -  Phalens  -   -  1st Compartment TTP -   -  Additional Findings  None      IMAGIN view x-rays of left shoulder were taken in the office today which do not show any acute or chronic bony abnormalities    ASSESSMENT & PLAN  Diagnosis: Left shoulder rotator cuff pain, right shoulder continued rotator cuff pain and slight weakness, left hand carpal tunnel    I recommended for Zandra's left hand that she see Dr. Shun Schulte and I placed her into a wrist splint today. For the left shoulder which is continued to have pain despite conservative treatment I recommended an MRI which I ordered. For the right shoulder which is now about 9 months out from surgery she still does have some slight weakness and pain. Full recovery is up to a year. If she continues to have persistent symptoms we did discuss the possibility of a repeat MRI. Follow-up after the MRI of the left shoulder is completed. Electronically signed by: Edison Murray MD    Note: This note was completed using voice recognition software.   Any typographical/name errors or mistakes are unintentional.

## 2022-02-14 ENCOUNTER — HOSPITAL ENCOUNTER (OUTPATIENT)
Dept: MRI IMAGING | Age: 47
Discharge: HOME | End: 2022-02-14
Attending: ORTHOPAEDIC SURGERY
Payer: OTHER GOVERNMENT

## 2022-02-14 PROCEDURE — 73221 MRI JOINT UPR EXTREM W/O DYE: CPT

## 2022-02-18 ENCOUNTER — OFFICE VISIT (OUTPATIENT)
Dept: ORTHOPEDIC SURGERY | Age: 47
End: 2022-02-18
Payer: OTHER GOVERNMENT

## 2022-02-18 VITALS
HEIGHT: 67 IN | TEMPERATURE: 97.5 F | WEIGHT: 192 LBS | BODY MASS INDEX: 30.13 KG/M2 | OXYGEN SATURATION: 100 % | HEART RATE: 76 BPM

## 2022-02-18 DIAGNOSIS — M75.101 NONTRAUMATIC TEAR OF RIGHT ROTATOR CUFF, UNSPECIFIED TEAR EXTENT: Primary | ICD-10-CM

## 2022-02-18 DIAGNOSIS — G56.02 LEFT CARPAL TUNNEL SYNDROME: ICD-10-CM

## 2022-02-18 PROCEDURE — 99214 OFFICE O/P EST MOD 30 MIN: CPT | Performed by: ORTHOPAEDIC SURGERY

## 2022-02-18 NOTE — PROGRESS NOTES
Patient: Teresa Stauffer                MRN: [id number]       SSN: [id number]  YOB: 1975        AGE: 50 y.o. SEX: female  Body mass index is 30.07 kg/m². PCP: Karley, Not On File, [facility]  02/18/22    Chief Complaint: Right shoulder pain, left shoulder pain , left hand numbness    HPI: Teresa Stauffer is a 50 y.o. female patient who returns to the office today for bilateral shoulder pain as well as left hand numbness and tingling. Her right shoulder is now about 10 months out from her arthroscopic rotator cuff repair which unfortunate she continues to have some pain despite physical therapy and treatment for this. Her left shoulder has less pain that she rates as a 1 out of 10 and she had an MRI of the left shoulder. Past Medical History:   Diagnosis Date    Anxiety     Anxiety     Depression     Insomnia     Migraine     PTSD (post-traumatic stress disorder)        Family History   Problem Relation Age of Onset    Cancer Sister         panreatic    Diabetes Mother     Heart Disease Mother        Current Outpatient Medications   Medication Sig Dispense Refill    QUEtiapine (SEROquel) 100 mg tablet TAKE ONE-HALF TABLET BY MOUTH AT BEDTIME FOR MOOD AND SLEEP      melatonin 5 mg tablet Take 5 mg by mouth nightly as needed.  diclofenac (Voltaren) 1 % gel Apply 4 g to affected area four (4) times daily. 100 g 3    calcium citrate 200 mg (950 mg) tablet Take  by mouth daily.  FERROUS SULFATE PO Take 1 Tab by mouth two (2) times a day.  ibuprofen (MOTRIN) 600 mg tablet Take 1 tablet by mouth every six (6) hours as needed for Pain. 20 tablet 0    traZODone (DESYREL) 100 mg tablet Take 100 mg by mouth nightly.  (Patient not taking: Reported on 1/12/2022)         Allergies   Allergen Reactions    Pcn [Penicillins] Angioedema       Past Surgical History:   Procedure Laterality Date    HX BREAST BIOPSY      left breast    VT ANESTH,SURGERY OF SHOULDER      torn rotator cuff and ligament repair       Social History     Socioeconomic History    Marital status:      Spouse name: Not on file    Number of children: Not on file    Years of education: Not on file    Highest education level: Not on file   Occupational History    Not on file   Tobacco Use    Smoking status: Never Smoker    Smokeless tobacco: Never Used   Substance and Sexual Activity    Alcohol use: No    Drug use: No    Sexual activity: Not on file   Other Topics Concern    Not on file   Social History Narrative    Not on file     Social Determinants of Health     Financial Resource Strain:     Difficulty of Paying Living Expenses: Not on file   Food Insecurity:     Worried About Running Out of Food in the Last Year: Not on file    Ashley of Food in the Last Year: Not on file   Transportation Needs:     Lack of Transportation (Medical): Not on file    Lack of Transportation (Non-Medical): Not on file   Physical Activity:     Days of Exercise per Week: Not on file    Minutes of Exercise per Session: Not on file   Stress:     Feeling of Stress : Not on file   Social Connections:     Frequency of Communication with Friends and Family: Not on file    Frequency of Social Gatherings with Friends and Family: Not on file    Attends Congregational Services: Not on file    Active Member of [address] [place] [address] or Organizations: Not on file    Attends Club or Organization Meetings: Not on file    Marital Status: Not on file   Intimate Partner Violence:     Fear of Current or Ex-Partner: Not on file    Emotionally Abused: Not on file    Physically Abused: Not on file    Sexually Abused: Not on file   Housing Stability:     Unable to Pay for Housing in the Last Year: Not on file    Number of [place] in the Last Year: Not on file    Unstable Housing in the Last Year: Not on file       REVIEW OF SYSTEMS:      No changes from previous review of systems unless noted.     PHYSICAL EXAMINATION:  Visit Vitals  Pulse 76   Temp 97.5 °F (36.4 °C)   Ht 5' 7\" (1.702 m)   Wt 192 lb (87.1 kg)   SpO2 100%   BMI 30.07 kg/m²     Body mass index is 30.07 kg/m². GENERAL: Alert and oriented x3, in no acute distress. HEENT: Normocephalic, atraumatic. Shoulder Examination     R   L  ROM   FF  Full   Full  ER  Full   Full   IR  Full   Full  Rotator Cuff Pain   Supra  +   +   Infra  -   -   Subscap -   -  Crepitus  -   -  Effusion  -   -  Warmth  -   -   Erythema  -   -  Instability  -   -  AC Joint TTP  -   -  Clavicle   Deformity -   -   TTP  -   -  Proximal Humerus   Deformity -   -   TTP  -   -  Deltoid Strength 5   5  Biceps Strength 5   5  Biceps Deformity -   -  Biceps Groove Pain -   -  Impingement Sign -   -     Hand Examination  R   L  Wrist ROM   Full   Full  Finger ROM   Full   Full  Deformity   -   -  Swelling   -   -  Erythema   -   -  Triggering   -   -  Carpal Tunnel    Tinels   -   -  Phalens  -   -  1st Compartment TTP -   -  Additional Findings  None      IMAGING:  An MRI of the left shoulder was reviewed. My interpretation of this is no full-thickness rotator cuff tearing. Partial-thickness tearing of the supraspinatus. No arthritis. ASSESSMENT & PLAN  Diagnosis: Right shoulder pain status post rotator cuff repair, left shoulder pain with partial rotator cuff tearing, left hand carpal tunnel    I recommend she see our hand surgeon Dr. Poly Giordano which is what I recommend at her last visit but unfortunately she was not contacted for an appointment. We gave her this information today again I placed another order for that. For her right shoulder which unfortunate continues to have symptoms and pain after rotator cuff surgery I recommended an MRI arthrogram of the right shoulder. For her left shoulder which does not show any full-thickness rotator cuff tearing and has less pain in the right I would not recommend surgery on that at this time.   I will see her back after the MRI is completed of her right shoulder. Prescription medication management discussed with patient. Electronically signed by: Margot Patrick MD    Note: This note was completed using voice recognition software.   Any typographical/name errors or mistakes are unintentional.

## 2022-03-04 DIAGNOSIS — M75.101 NONTRAUMATIC TEAR OF RIGHT ROTATOR CUFF, UNSPECIFIED TEAR EXTENT: ICD-10-CM

## 2022-03-09 ENCOUNTER — HOSPITAL ENCOUNTER (OUTPATIENT)
Dept: MRI IMAGING | Age: 47
Discharge: HOME | End: 2022-03-09
Attending: ORTHOPAEDIC SURGERY
Payer: OTHER GOVERNMENT

## 2022-03-09 ENCOUNTER — HOSPITAL ENCOUNTER (OUTPATIENT)
Dept: GENERAL RADIOLOGY | Age: 47
Discharge: HOME | End: 2022-03-09
Attending: ORTHOPAEDIC SURGERY
Payer: OTHER GOVERNMENT

## 2022-03-09 PROCEDURE — A9576 INJ PROHANCE MULTIPACK: HCPCS | Performed by: ORTHOPAEDIC SURGERY

## 2022-03-09 PROCEDURE — 77002 NEEDLE LOCALIZATION BY XRAY: CPT

## 2022-03-09 PROCEDURE — 73222 MRI JOINT UPR EXTREM W/DYE: CPT

## 2022-03-09 PROCEDURE — 74011000636 HC RX REV CODE- 636: Performed by: ORTHOPAEDIC SURGERY

## 2022-03-09 PROCEDURE — 74011250636 HC RX REV CODE- 250/636: Performed by: ORTHOPAEDIC SURGERY

## 2022-03-09 PROCEDURE — 74011000250 HC RX REV CODE- 250: Performed by: ORTHOPAEDIC SURGERY

## 2022-03-09 RX ORDER — SODIUM CHLORIDE 9 MG/ML
10 INJECTION INTRAMUSCULAR; INTRAVENOUS; SUBCUTANEOUS
Status: COMPLETED | OUTPATIENT
Start: 2022-03-09 | End: 2022-03-09

## 2022-03-09 RX ORDER — LIDOCAINE HYDROCHLORIDE 10 MG/ML
5 INJECTION, SOLUTION EPIDURAL; INFILTRATION; INTRACAUDAL; PERINEURAL
Status: COMPLETED | OUTPATIENT
Start: 2022-03-09 | End: 2022-03-09

## 2022-03-09 RX ADMIN — SODIUM CHLORIDE 5 ML: 9 INJECTION, SOLUTION INTRAMUSCULAR; INTRAVENOUS; SUBCUTANEOUS at 09:21

## 2022-03-09 RX ADMIN — IOHEXOL 5 ML: 180 INJECTION INTRAVENOUS at 09:21

## 2022-03-09 RX ADMIN — GADOTERIDOL 0.15 ML: 279.3 INJECTION, SOLUTION INTRAVENOUS at 09:21

## 2022-03-09 RX ADMIN — LIDOCAINE HYDROCHLORIDE 5 ML: 10 INJECTION, SOLUTION EPIDURAL; INFILTRATION; INTRACAUDAL; PERINEURAL at 09:21

## 2023-12-13 ENCOUNTER — OFFICE VISIT (OUTPATIENT)
Age: 48
End: 2023-12-13
Payer: OTHER GOVERNMENT

## 2023-12-13 VITALS — RESPIRATION RATE: 18 BRPM | BODY MASS INDEX: 30.07 KG/M2 | HEIGHT: 67 IN

## 2023-12-13 DIAGNOSIS — M75.22 TENDINITIS OF LONG HEAD OF BICEPS BRACHII OF LEFT SHOULDER: Primary | ICD-10-CM

## 2023-12-13 DIAGNOSIS — G89.29 CHRONIC LEFT SHOULDER PAIN: ICD-10-CM

## 2023-12-13 DIAGNOSIS — M25.512 CHRONIC LEFT SHOULDER PAIN: ICD-10-CM

## 2023-12-13 PROCEDURE — 73030 X-RAY EXAM OF SHOULDER: CPT | Performed by: ORTHOPAEDIC SURGERY

## 2023-12-13 PROCEDURE — 99214 OFFICE O/P EST MOD 30 MIN: CPT | Performed by: ORTHOPAEDIC SURGERY

## 2023-12-13 NOTE — PATIENT INSTRUCTIONS
If we order a Diagnostic test (such as MRI or CT) during your office visit please see below:     Coordination of Care will be calling you to schedule your diagnostic test. If you have not heard from Coordination of Care within 3 business days, please call [phone]. If you would like your test scheduled with Alesia Cole, please call Central Scheduling at [phone] for appointment scheduling. Once scheduled, please call us back at [phone] to schedule your follow up appointment. Once you have a date scheduled for your diagnostic test, you will need to contact our office to schedule a follow up appointment, as this is when the physician will review your diagnostic test results with you. You can contact our office to schedule appointment by phone at [phone], or you can send a message via [employer] to request an appointment.     Left shoulder MRI ordered today, 12/13/2023